# Patient Record
Sex: MALE | Race: WHITE
[De-identification: names, ages, dates, MRNs, and addresses within clinical notes are randomized per-mention and may not be internally consistent; named-entity substitution may affect disease eponyms.]

---

## 2021-08-23 ENCOUNTER — HOSPITAL ENCOUNTER (OUTPATIENT)
Dept: HOSPITAL 47 - LABWHC1 | Age: 31
Discharge: HOME | End: 2021-08-23
Attending: INTERNAL MEDICINE
Payer: COMMERCIAL

## 2021-08-23 DIAGNOSIS — Z00.00: Primary | ICD-10-CM

## 2021-08-23 LAB
ALBUMIN SERPL-MCNC: 5.1 G/DL (ref 3.8–4.9)
ALBUMIN/GLOB SERPL: 2.32 G/DL (ref 1.6–3.17)
ALP SERPL-CCNC: 89 U/L (ref 41–126)
ALT SERPL-CCNC: 36 U/L (ref 10–49)
ANION GAP SERPL CALC-SCNC: 12.6 MMOL/L (ref 4–12)
AST SERPL-CCNC: 20 U/L (ref 14–35)
BASOPHILS # BLD AUTO: 0.02 X 10*3/UL (ref 0–0.1)
BASOPHILS NFR BLD AUTO: 0.2 %
BUN SERPL-SCNC: 15 MG/DL (ref 9–27)
BUN/CREAT SERPL: 13.64 RATIO (ref 12–20)
CALCIUM SPEC-MCNC: 9.5 MG/DL (ref 8.7–10.3)
CHLORIDE SERPL-SCNC: 105 MMOL/L (ref 96–109)
CHOLEST SERPL-MCNC: 200 MG/DL (ref 0–200)
CO2 SERPL-SCNC: 24.4 MMOL/L (ref 21.6–31.8)
EOSINOPHIL # BLD AUTO: 0.15 X 10*3/UL (ref 0.04–0.35)
EOSINOPHIL NFR BLD AUTO: 1.9 %
ERYTHROCYTE [DISTWIDTH] IN BLOOD BY AUTOMATED COUNT: 6.16 X 10*6/UL (ref 4.4–5.6)
ERYTHROCYTE [DISTWIDTH] IN BLOOD: 15 % (ref 11.5–14.5)
GLOBULIN SER CALC-MCNC: 2.2 G/DL (ref 1.6–3.3)
GLUCOSE SERPL-MCNC: 97 MG/DL (ref 70–110)
HBA1C MFR BLD: 5.6 % (ref 4–6)
HCT VFR BLD AUTO: 51.6 % (ref 39.6–50)
HDLC SERPL-MCNC: 58 MG/DL (ref 40–60)
HGB BLD-MCNC: 15.6 G/DL (ref 13–17)
LDLC SERPL CALC-MCNC: 127.2 MG/DL (ref 0–131)
LYMPHOCYTES # SPEC AUTO: 2.03 X 10*3/UL (ref 0.9–5)
LYMPHOCYTES NFR SPEC AUTO: 25.1 %
MCH RBC QN AUTO: 25.3 PG (ref 27–32)
MCHC RBC AUTO-ENTMCNC: 30.2 G/DL (ref 32–37)
MCV RBC AUTO: 83.8 FL (ref 80–97)
MONOCYTES # BLD AUTO: 0.58 X 10*3/UL (ref 0.2–1)
MONOCYTES NFR BLD AUTO: 7.2 %
NEUTROPHILS # BLD AUTO: 5.29 X 10*3/UL (ref 1.8–7.7)
NEUTROPHILS NFR BLD AUTO: 65.4 %
PLATELET # BLD AUTO: 234 X 10*3/UL (ref 140–440)
POTASSIUM SERPL-SCNC: 4.7 MMOL/L (ref 3.5–5.5)
PROT SERPL-MCNC: 7.3 G/DL (ref 6.2–8.2)
PSA SERPL-MCNC: 0.4 NG/ML (ref 0–4)
SODIUM SERPL-SCNC: 142 MMOL/L (ref 135–145)
TRIGL SERPL-MCNC: 74 MG/DL (ref 0–149)
VIT B12 SERPL-MCNC: 1204 PG/ML (ref 200–944)
VLDLC SERPL CALC-MCNC: 14.8 MG/DL (ref 5–40)
WBC # BLD AUTO: 8.09 X 10*3/UL (ref 4.5–10)

## 2021-08-23 PROCEDURE — 82746 ASSAY OF FOLIC ACID SERUM: CPT

## 2021-08-23 PROCEDURE — 82306 VITAMIN D 25 HYDROXY: CPT

## 2021-08-23 PROCEDURE — 36415 COLL VENOUS BLD VENIPUNCTURE: CPT

## 2021-08-23 PROCEDURE — 80061 LIPID PANEL: CPT

## 2021-08-23 PROCEDURE — 82607 VITAMIN B-12: CPT

## 2021-08-23 PROCEDURE — 80053 COMPREHEN METABOLIC PANEL: CPT

## 2021-08-23 PROCEDURE — 83036 HEMOGLOBIN GLYCOSYLATED A1C: CPT

## 2021-08-23 PROCEDURE — 84443 ASSAY THYROID STIM HORMONE: CPT

## 2021-08-23 PROCEDURE — 85025 COMPLETE CBC W/AUTO DIFF WBC: CPT

## 2021-12-08 ENCOUNTER — HOSPITAL ENCOUNTER (INPATIENT)
Dept: HOSPITAL 47 - EC | Age: 31
LOS: 5 days | Discharge: HOME | DRG: 177 | End: 2021-12-13
Attending: INTERNAL MEDICINE | Admitting: INTERNAL MEDICINE
Payer: COMMERCIAL

## 2021-12-08 DIAGNOSIS — J12.82: ICD-10-CM

## 2021-12-08 DIAGNOSIS — R16.0: ICD-10-CM

## 2021-12-08 DIAGNOSIS — R74.02: ICD-10-CM

## 2021-12-08 DIAGNOSIS — J96.01: ICD-10-CM

## 2021-12-08 DIAGNOSIS — R79.89: ICD-10-CM

## 2021-12-08 DIAGNOSIS — R53.81: ICD-10-CM

## 2021-12-08 DIAGNOSIS — F98.8: ICD-10-CM

## 2021-12-08 DIAGNOSIS — Z79.82: ICD-10-CM

## 2021-12-08 DIAGNOSIS — U07.1: Primary | ICD-10-CM

## 2021-12-08 DIAGNOSIS — Z79.01: ICD-10-CM

## 2021-12-08 DIAGNOSIS — R79.82: ICD-10-CM

## 2021-12-08 DIAGNOSIS — E66.9: ICD-10-CM

## 2021-12-08 DIAGNOSIS — R74.8: ICD-10-CM

## 2021-12-08 DIAGNOSIS — R74.01: ICD-10-CM

## 2021-12-08 DIAGNOSIS — K80.20: ICD-10-CM

## 2021-12-08 DIAGNOSIS — I10: ICD-10-CM

## 2021-12-08 LAB
ALBUMIN SERPL-MCNC: 4 G/DL (ref 3.5–5)
ALP SERPL-CCNC: 92 U/L (ref 38–126)
ALT SERPL-CCNC: 68 U/L (ref 4–49)
ANION GAP SERPL CALC-SCNC: 13 MMOL/L
APTT BLD: 24.3 SEC (ref 22–30)
AST SERPL-CCNC: 54 U/L (ref 17–59)
BASOPHILS # BLD AUTO: 0 K/UL (ref 0–0.2)
BASOPHILS NFR BLD AUTO: 0 %
BUN SERPL-SCNC: 20 MG/DL (ref 9–20)
CALCIUM SPEC-MCNC: 8.6 MG/DL (ref 8.4–10.2)
CHLORIDE SERPL-SCNC: 105 MMOL/L (ref 98–107)
CO2 SERPL-SCNC: 23 MMOL/L (ref 22–30)
EOSINOPHIL # BLD AUTO: 0 K/UL (ref 0–0.7)
EOSINOPHIL NFR BLD AUTO: 0 %
ERYTHROCYTE [DISTWIDTH] IN BLOOD BY AUTOMATED COUNT: 6.31 M/UL (ref 4.3–5.9)
ERYTHROCYTE [DISTWIDTH] IN BLOOD: 13.7 % (ref 11.5–15.5)
FERRITIN SERPL-MCNC: 848 NG/ML (ref 22–322)
GLUCOSE SERPL-MCNC: 97 MG/DL (ref 74–99)
HCT VFR BLD AUTO: 52.2 % (ref 39–53)
HGB BLD-MCNC: 16.9 GM/DL (ref 13–17.5)
INR PPP: 1.3 (ref ?–1.2)
LDH SPEC-CCNC: 941 U/L (ref 313–618)
LYMPHOCYTES # SPEC AUTO: 0.8 K/UL (ref 1–4.8)
LYMPHOCYTES NFR SPEC AUTO: 12 %
MAGNESIUM SPEC-SCNC: 1.8 MG/DL (ref 1.6–2.3)
MCH RBC QN AUTO: 26.7 PG (ref 25–35)
MCHC RBC AUTO-ENTMCNC: 32.3 G/DL (ref 31–37)
MCV RBC AUTO: 82.6 FL (ref 80–100)
MONOCYTES # BLD AUTO: 0.5 K/UL (ref 0–1)
MONOCYTES NFR BLD AUTO: 7 %
NEUTROPHILS # BLD AUTO: 5.4 K/UL (ref 1.3–7.7)
NEUTROPHILS NFR BLD AUTO: 79 %
PLATELET # BLD AUTO: 160 K/UL (ref 150–450)
POTASSIUM SERPL-SCNC: 4.8 MMOL/L (ref 3.5–5.1)
PROT SERPL-MCNC: 7 G/DL (ref 6.3–8.2)
PT BLD: 13 SEC (ref 9–12)
SODIUM SERPL-SCNC: 141 MMOL/L (ref 137–145)
WBC # BLD AUTO: 6.8 K/UL (ref 3.8–10.6)

## 2021-12-08 PROCEDURE — 86140 C-REACTIVE PROTEIN: CPT

## 2021-12-08 PROCEDURE — 87635 SARS-COV-2 COVID-19 AMP PRB: CPT

## 2021-12-08 PROCEDURE — XW033E5 INTRODUCTION OF REMDESIVIR ANTI-INFECTIVE INTO PERIPHERAL VEIN, PERCUTANEOUS APPROACH, NEW TECHNOLOGY GROUP 5: ICD-10-PCS

## 2021-12-08 PROCEDURE — 86707 HEPATITIS BE ANTIBODY: CPT

## 2021-12-08 PROCEDURE — 86738 MYCOPLASMA ANTIBODY: CPT

## 2021-12-08 PROCEDURE — 99285 EMERGENCY DEPT VISIT HI MDM: CPT

## 2021-12-08 PROCEDURE — 86803 HEPATITIS C AB TEST: CPT

## 2021-12-08 PROCEDURE — 93970 EXTREMITY STUDY: CPT

## 2021-12-08 PROCEDURE — 85025 COMPLETE CBC W/AUTO DIFF WBC: CPT

## 2021-12-08 PROCEDURE — 71046 X-RAY EXAM CHEST 2 VIEWS: CPT

## 2021-12-08 PROCEDURE — 93005 ELECTROCARDIOGRAM TRACING: CPT

## 2021-12-08 PROCEDURE — 81001 URINALYSIS AUTO W/SCOPE: CPT

## 2021-12-08 PROCEDURE — 85730 THROMBOPLASTIN TIME PARTIAL: CPT

## 2021-12-08 PROCEDURE — 87350 HEPATITIS BE AG IA: CPT

## 2021-12-08 PROCEDURE — 83735 ASSAY OF MAGNESIUM: CPT

## 2021-12-08 PROCEDURE — 87502 INFLUENZA DNA AMP PROBE: CPT

## 2021-12-08 PROCEDURE — 83615 LACTATE (LD) (LDH) ENZYME: CPT

## 2021-12-08 PROCEDURE — 83605 ASSAY OF LACTIC ACID: CPT

## 2021-12-08 PROCEDURE — 86708 HEPATITIS A ANTIBODY: CPT

## 2021-12-08 PROCEDURE — 82728 ASSAY OF FERRITIN: CPT

## 2021-12-08 PROCEDURE — 86709 HEPATITIS A IGM ANTIBODY: CPT

## 2021-12-08 PROCEDURE — 85027 COMPLETE CBC AUTOMATED: CPT

## 2021-12-08 PROCEDURE — 36415 COLL VENOUS BLD VENIPUNCTURE: CPT

## 2021-12-08 PROCEDURE — 85379 FIBRIN DEGRADATION QUANT: CPT

## 2021-12-08 PROCEDURE — 80053 COMPREHEN METABOLIC PANEL: CPT

## 2021-12-08 PROCEDURE — 76705 ECHO EXAM OF ABDOMEN: CPT

## 2021-12-08 PROCEDURE — 87340 HEPATITIS B SURFACE AG IA: CPT

## 2021-12-08 PROCEDURE — 87040 BLOOD CULTURE FOR BACTERIA: CPT

## 2021-12-08 PROCEDURE — 86706 HEP B SURFACE ANTIBODY: CPT

## 2021-12-08 PROCEDURE — 71045 X-RAY EXAM CHEST 1 VIEW: CPT

## 2021-12-08 PROCEDURE — 85610 PROTHROMBIN TIME: CPT

## 2021-12-08 PROCEDURE — 84145 PROCALCITONIN (PCT): CPT

## 2021-12-08 PROCEDURE — 86704 HEP B CORE ANTIBODY TOTAL: CPT

## 2021-12-08 PROCEDURE — 96374 THER/PROPH/DIAG INJ IV PUSH: CPT

## 2021-12-08 RX ADMIN — CEFAZOLIN SCH: 330 INJECTION, POWDER, FOR SOLUTION INTRAMUSCULAR; INTRAVENOUS at 22:34

## 2021-12-08 RX ADMIN — CEFAZOLIN SCH MLS/HR: 330 INJECTION, POWDER, FOR SOLUTION INTRAMUSCULAR; INTRAVENOUS at 18:45

## 2021-12-08 RX ADMIN — CEFAZOLIN SCH MLS/HR: 330 INJECTION, POWDER, FOR SOLUTION INTRAMUSCULAR; INTRAVENOUS at 22:08

## 2021-12-08 NOTE — XR
EXAMINATION TYPE: XR chest 2V

 

DATE OF EXAM: 12/8/2021

 

COMPARISON: NONE

 

HISTORY: Suspected Covid 19 pneumonia, cough and chest congestion, Covid positive

 

TECHNIQUE:  Frontal and lateral views of the chest are obtained.

 

FINDINGS:  Lung volumes are low and the patient is rotated. Patchy increased attenuation is present i
n the bilateral lungs. No evident pneumothorax or pleural effusion. Cardiac mediastinal silhouette wi
thin normal limits accounting for technique.

 

IMPRESSION:  Expiratory rotated exam. Correlate for pneumonia. Follow-up as indicated.

## 2021-12-08 NOTE — ED
SOB HPI





- General


Chief Complaint: Shortness of Breath


Stated Complaint: Covid+/AYESHA


Time Seen by Provider: 12/08/21 09:22


Source: patient, RN notes reviewed


Mode of arrival: wheelchair


Limitations: no limitations





- History of Present Illness


Initial Comments: 





31-year-old male that presents to the emergency department complaining of 

increased shortness of breath especially on exertion.  He notes he tested 

positive for Covid 3 days ago at [facility].  Patient notes that 

he was sent home with medications but is not getting any better.  Patient didn't

appear to be mildly distressed and short of breath while sitting up in bed 

during exam and interview.  He denied any chest pain headache nausea vomiting 

diarrhea constipation fatigue chills.





- Related Data


                                    Allergies











Allergy/AdvReac Type Severity Reaction Status Date / Time


 


No Known Allergies Allergy   Verified 12/08/21 11:28














Review of Systems


ROS Statement: 


Those systems with pertinent positive or pertinent negative responses have been 

documented in the HPI.





ROS Other: All systems not noted in ROS Statement are negative.





Past Medical History


Past Medical History: No Reported History


History of Any Multi-Drug Resistant Organisms: None Reported


Past Surgical History: No Surgical Hx Reported


Past Psychological History: No Psychological Hx Reported


Smoking Status: Never smoker


Past Alcohol Use History: Rare


Past Drug Use History: None Reported





General Exam


Limitations: no limitations


General appearance: alert, in no apparent distress, obese


Head exam: Present: atraumatic, normocephalic, normal inspection


Eye exam: Present: normal appearance, PERRL, EOMI.  Absent: scleral icterus, 

conjunctival injection, periorbital swelling


ENT exam: Present: normal exam


Neck exam: Present: normal inspection.  Absent: tenderness, meningismus, 

lymphadenopathy


Respiratory exam: Present: decreased breath sounds (Bilaterally lower lobes).  

Absent: respiratory distress, wheezes, rales, rhonchi, stridor


Cardiovascular Exam: Present: regular rate, normal rhythm, normal heart sounds. 

Absent: systolic murmur, diastolic murmur, rubs, gallop, clicks


GI/Abdominal exam: Present: soft, normal bowel sounds.  Absent: distended, 

tenderness, guarding, rebound, rigid


Extremities exam: Present: normal inspection, full ROM, normal capillary refill.

 Absent: tenderness, pedal edema, joint swelling, calf tenderness


Neurological exam: Present: alert, oriented X3


Psychiatric exam: Present: normal affect, normal mood


Skin exam: Present: warm, dry, intact, normal color.  Absent: rash





Course





                                   Vital Signs











  12/08/21 12/08/21 12/08/21





  09:15 09:58 10:22


 


Temperature 100.6 F H 101.6 F H 


 


Pulse Rate 104 H  


 


Respiratory 18  





Rate   


 


Blood Pressure 130/84  


 


O2 Sat by Pulse 94 L  91 L





Oximetry   














  12/08/21 12/08/21





  11:05 11:12


 


Temperature  101.2 F H


 


Pulse Rate  105 H


 


Respiratory  18





Rate  


 


Blood Pressure  124/86


 


O2 Sat by Pulse 87 L 91 L





Oximetry  














Medical Decision Making





- Medical Decision Making





31-year-old male Covid-positive increased shortness of breath.


Labs, chest x-ray, 600 mg of Motrin, 650 mg of Tylenol, 10 mg of Decadron 

ordered.


Labs consistent with Covid with elevated LDH and C-reactive protein.


Chest x-ray shows bilateral infiltrates consistent with pneumonia.


Patient oxygen saturation on room air is 90-91% while sitting in bed.  Patient 

was walked in from sitting to standing patient oxygen dropped to 86-87%.


2 L oxygen was started.


Case discussed with Dr. Juárez, patient will be admitted.


Dr. Dennis was consulted and will accept the admit





- Lab Data


Result diagrams: 


                                 12/08/21 09:57





                                 12/08/21 09:57





                                   Lab Results











  12/08/21 12/08/21 12/08/21 Range/Units





  09:57 09:57 09:57 


 


WBC  6.8    (3.8-10.6)  k/uL


 


RBC  6.31 H    (4.30-5.90)  m/uL


 


Hgb  16.9    (13.0-17.5)  gm/dL


 


Hct  52.2    (39.0-53.0)  %


 


MCV  82.6    (80.0-100.0)  fL


 


MCH  26.7    (25.0-35.0)  pg


 


MCHC  32.3    (31.0-37.0)  g/dL


 


RDW  13.7    (11.5-15.5)  %


 


Plt Count  160    (150-450)  k/uL


 


MPV  8.9    


 


Neutrophils %  79    %


 


Lymphocytes %  12    %


 


Monocytes %  7    %


 


Eosinophils %  0    %


 


Basophils %  0    %


 


Neutrophils #  5.4    (1.3-7.7)  k/uL


 


Lymphocytes #  0.8 L    (1.0-4.8)  k/uL


 


Monocytes #  0.5    (0-1.0)  k/uL


 


Eosinophils #  0.0    (0-0.7)  k/uL


 


Basophils #  0.0    (0-0.2)  k/uL


 


Sodium   141   (137-145)  mmol/L


 


Potassium   4.8   (3.5-5.1)  mmol/L


 


Chloride   105   ()  mmol/L


 


Carbon Dioxide   23   (22-30)  mmol/L


 


Anion Gap   13   mmol/L


 


BUN   20   (9-20)  mg/dL


 


Creatinine   1.24   (0.66-1.25)  mg/dL


 


Est GFR (CKD-EPI)AfAm   88   (>60 ml/min/1.73 sqM)  


 


Est GFR (CKD-EPI)NonAf   76   (>60 ml/min/1.73 sqM)  


 


Glucose   97   (74-99)  mg/dL


 


Plasma Lactic Acid Colin    1.1  (0.7-2.0)  mmol/L


 


Calcium   8.6   (8.4-10.2)  mg/dL


 


Magnesium   1.8   (1.6-2.3)  mg/dL


 


Total Bilirubin   0.6   (0.2-1.3)  mg/dL


 


AST   54   (17-59)  U/L


 


ALT   68 H   (4-49)  U/L


 


Alkaline Phosphatase   92   ()  U/L


 


Lactate Dehydrogenase   941 H   (313-618)  U/L


 


C-Reactive Protein   4.8 H   (<1.0)  mg/dL


 


Total Protein   7.0   (6.3-8.2)  g/dL


 


Albumin   4.0   (3.5-5.0)  g/dL














Disposition


Clinical Impression: 


 Pneumonia due to COVID-19 virus, Hypoxia, Fever





Disposition: ADMITTED AS IP TO THIS [facility]


Condition: Stable


Referrals: 


Cruz Liang MD [Primary Care Provider] - 1-2 days


Time of Disposition: 11:37

## 2021-12-08 NOTE — P.CNPUL
History of Present Illness


Consult date: 12/08/21


Requesting physician: Floyd Avina


Reason for consult: dyspnea, hypoxemia, abnormal CXR/CT


Chief complaint: Shortness of breath, cough, congestion


History of present illness: 





This is a very pleasant 31-year-old male patient with a history of obesity.  6 

days ago the patient went to put and his contact lens and felt that he had 

gotten something else 5.  A gun to [facility] for evaluation.  He

was found to have COVID-19 infection.  He was treated with azithromycin, Decad

winnie and an albuterol inhaler.  He presented here early there this morning with 

worsening shortness of breath cough and congestion.  Chest x-ray revealed 

evidence of low lung volumes.  Some patchy bilateral infiltrates consistent with

COVID-19 pneumonia.  White count 6.8.  Hemoglobin 16.9.  Lymphocytes 0.8.  

Sodium 141.  Potassium 4.8.  Creatinine 1.24.  AST 54.  ALT 68.  .  C-

reactive protein 4.8.  Corona virus by PCR positive.  He's been initiated on 

Lovenox and Decadron.  0.9 normal saline at 130 ML's per hour.  He is seen today

in consultation in the emergency department.  He is currently laying fairly 

comfortably on a stretcher.  He is quite weak and fatigued.  Short of breath wit

h minimal exertion.  Short of breath with conversation.  He is maintaining O2 

saturations in the low 90s on 2 L/m per nasal cannula, 87% saturation on room 

air.  He is febrile with a temperature of 101.6.  Blood pressure stable.  

Tachycardic.





Review of Systems





REVIEW OF SYSTEMS:


CONSTITUTIONAL: Generalized weakness, fatigue, fever.  Denies any recent 

significant weight loss or weight gain.


EYES: Denies change in vision.


EARS, NOSE, MOUTH, THROAT: Denies headaches, denies sore throat.


CARDIOVASCULAR: Denies chest pain, palpitations or syncopal episodes.


RESPIRATORY: Positive for shortness of breath, cough, congestion or hemoptysis.


GASTROINTESTINAL: Denies change in appetite, denies abdominal pain


GENITOURINARY: Denies hematuria, denies infections.


MUSKULOSKELETAL: Denies pain, denies swelling.


INTEGUMENTARY: Denies rash, denies eczema.


NEUROLOGICAL: Denies recent memory loss, no recent seizure activity. 


PSYCHIATRIC: Denies anxiety, denies depression.


HEMATOLOGIC/LYMPHATIC: Denies anemia, denies enlarged lymph nodes.








Past Medical History


Past Medical History: No Reported History


History of Any Multi-Drug Resistant Organisms: None Reported


Past Surgical History: No Surgical Hx Reported


Past Psychological History: No Psychological Hx Reported


Smoking Status: Never smoker


Past Alcohol Use History: Rare


Past Drug Use History: None Reported





Medications and Allergies


                                Home Medications











 Medication  Instructions  Recorded  Confirmed  Type


 


Albuterol Inhaler [Ventolin Hfa 1 - 2 puff INHALATION RT-Q4H PRN 12/08/21 12/08/21 History





Inhaler]    


 


Aspirin EC [Ecotrin] 325 mg PO DAILY 12/08/21 12/08/21 History


 


Azithromycin [Zithromax Z-pack (6 See Taper PO DAILY 12/08/21 12/08/21 History





tabs)]    


 


Butalbital/Acetaminophen 1 tab PO Q6H PRN 12/08/21 12/08/21 History





[Butalbital/Acetaminophen ]    


 


Dexamethasone [Decadron] 4 mg PO Q12H 12/08/21 12/08/21 History


 


Losartan Potassium 100 mg PO DAILY 12/08/21 12/08/21 History


 


Phentermine HCl [Adipex-P] 37.5 mg PO DAILY 12/08/21 12/08/21 History








                                    Allergies











Allergy/AdvReac Type Severity Reaction Status Date / Time


 


No Known Allergies Allergy   Verified 12/08/21 11:28














Physical Exam


Vitals: 


                                   Vital Signs











  Temp Pulse Resp BP Pulse Ox


 


 12/08/21 11:12  101.2 F H  105 H  18  124/86  91 L


 


 12/08/21 11:05      87 L


 


 12/08/21 10:22      91 L


 


 12/08/21 09:58  101.6 F H    


 


 12/08/21 09:15  100.6 F H  104 H  18  130/84  94 L








                                Intake and Output











 12/07/21 12/08/21 12/08/21





 22:59 06:59 14:59


 


Other:   


 


  Weight   122.47 kg














GENERAL EXAM: Alert, pleasant 31-year-old, on 2 L/m per nasal cannula, afebrile,

appears ill. 


HEAD: Normocephalic.


EYES: Normal reaction of pupils, equal size.


NOSE: Clear with pink turbinates.


THROAT: No erythema or exudates.


NECK: No masses, no JVD.


CHEST: No chest wall deformity.


LUNGS: Equal air entry with crackles in bilateral bases


VS: S1 and S2 normal with no audible murmur, regular rhythm.


ABDOMEN: No hepatosplenomegaly, normal bowel sounds, no guarding or rigidity.


SPINE: No scoliosis or deformity


SKIN: No rashes


CENTRAL NERVOUS SYSTEM: No focal deficits, tone is normal in all 4 extremities.


EXTREMITIES: There is no peripheral edema.  No clubbing, no cyanosis.  

Peripheral pulses are intact.





Results





- Laboratory Findings


CBC and BMP: 


                                 12/08/21 09:57





                                 12/08/21 09:57


Abnormal lab findings: 


                                  Abnormal Labs











  12/08/21 12/08/21 12/08/21





  09:57 09:57 11:37


 


RBC  6.31 H  


 


Lymphocytes #  0.8 L  


 


ALT   68 H 


 


Lactate Dehydrogenase   941 H 


 


C-Reactive Protein   4.8 H 


 


Coronavirus (PCR)    Detected A














- Diagnostic Findings


Chest x-ray: image reviewed





Assessment and Plan


Assessment: 





1 Acute hypoxic respiratory failure secondary to COVID-19 pneumonia, currently 

maintaining O2 saturations in the low 90s on 2 L/m per nasal cannula. Not 

vaccinated. Symptoms started 6 days ago.  We will initiate Remdesivir.  





2 Elevated inflammatory markers secondary to above 





3 Obesity BMI of 37.7 kg/m





Plan:





The patient was seen and evaluated


Chest x-ray and labs reviewed


Initiate Lovenox, Decadron, vitamin supplements


Initiate Remdesivir


Titrate the FiO2 as tolerated


We will continue to follow and make further recommendations based on his 

clinical status





I, the cosigning physician, performed a history & physical examination of the 

patient. Lungs sounds crackles in the bilateral bases.  Maintaining good O2 

saturations in the 90s on 2 L/m per nasal cannula.  I discussed the assessment a

nd plan of care with my nurse practitioner, Malu Cristobal. I attest to the above 

consultation as dictated by her.


Time with Patient: Greater than 30

## 2021-12-08 NOTE — P.HPIM
History of Present Illness


Chief Complaint: Fever and shortness of breath





This is a very pleasant 30-year-old male with history of hypertension who came 

to emergency department for abolition of shortness of breath and fevers.  5 days

prior to this admission, about this last Friday, patient started developing URI-

like symptoms with sore throat and runny nose loss of sense of smell and taste 

and started having fevers.  He went to the urgent care where he was diagnosed 

with COVID-19.  he was given albuterol when necessary, Zithromax and dexame

thasone and sent home.  He is respiratory symptoms continued to worsen in or the

last couple days he started having lots of dry cough shortness of breath with 

minimal activity feeling of the chest congestion generalized weakness malaise.  

He continued to run fevers above 101.  He eventually came to MRSA department.  

Chest x-ray showed bilateral pneumonia.  Patient was hypoxic with oxygen in the 

range of 87% on room air.  He was placed on nasal cannula even admitted to the 

hospital.  Otherwise he's feeling very tired and weak.  He denies any purulence 

in his sputum.  He denies any chest pain leg swelling diarrhea or abdominal 

pain.  His appetite remains poor.  CBC showed a normal white blood cell count.  

Biochemistry with normal renal function and lactulose.  His CRP is 4.8.  Rest of

the labs currently pending.


Patient states that he has history of hypertension for which he was prescribed 

losartan however he does not take it.  He is vaccinated.  He also has attention 

deficit disorder for which she is taking phentermine.  He denies any history of 

asthma, smoking or any frequent respiratory infections or pneumonias.





Review of Systems


All systems: negative





Past Medical History


Past Medical History: No Reported History


History of Any Multi-Drug Resistant Organisms: None Reported


Past Surgical History: No Surgical Hx Reported


Past Psychological History: No Psychological Hx Reported


Smoking Status: Never smoker


Past Alcohol Use History: Rare


Past Drug Use History: None Reported





Medications and Allergies


                                Home Medications











 Medication  Instructions  Recorded  Confirmed  Type


 


Albuterol Inhaler [Ventolin Hfa 1 - 2 puff INHALATION RT-Q4H PRN 12/08/21 12/08/21 History





Inhaler]    


 


Aspirin EC [Ecotrin] 325 mg PO DAILY 12/08/21 12/08/21 History


 


Azithromycin [Zithromax Z-pack (6 See Taper PO DAILY 12/08/21 12/08/21 History





tabs)]    


 


Butalbital/Acetaminophen 1 tab PO Q6H PRN 12/08/21 12/08/21 History





[Butalbital/Acetaminophen ]    


 


Dexamethasone [Decadron] 4 mg PO Q12H 12/08/21 12/08/21 History


 


Losartan Potassium 100 mg PO DAILY 12/08/21 12/08/21 History


 


Phentermine HCl [Adipex-P] 37.5 mg PO DAILY 12/08/21 12/08/21 History








                                    Allergies











Allergy/AdvReac Type Severity Reaction Status Date / Time


 


No Known Allergies Allergy   Verified 12/08/21 11:28














Physical Exam


Vitals: 


                                   Vital Signs











  Temp Pulse Resp BP Pulse Ox


 


 12/08/21 11:12  101.2 F H  105 H  18  124/86  91 L


 


 12/08/21 11:05      87 L


 


 12/08/21 10:22      91 L


 


 12/08/21 09:58  101.6 F H    


 


 12/08/21 09:15  100.6 F H  104 H  18  130/84  94 L








                                Intake and Output











 12/07/21 12/08/21 12/08/21





 22:59 06:59 14:59


 


Other:   


 


  Weight   122.47 kg














Patient is awake alert oriented 3 no any distress.  Appears somewhat tired


Head and neck: Anicteric sclera or finger mucosa is moist without lesions no 

neck masses no JVD no facial asymmetry


Lungs nonlabored breathing, on nasal cannula, normal breath sounds, bibasilar 

fine crackles, no rhonchi or wheezing


Cardiovascular: Regular rhythm and rate S1-S2 no murmurs rubs or gallops


Abdomen is soft nontender nondistended no organomegaly no flank tenderness bowel

sounds are present


Extremities: No peripheral edema warm well perfused no cough tenderness no cyan

osis


Muscular skeletal: No joint swelling or deformities


Skin: No rashes


Neurological: No motor deficits no asterixis no chronic nerve deficits


Psychiatric: No depression or anxiety





Results


CBC & Chem 7: 


                                 12/08/21 09:57





                                 12/08/21 09:57


Labs: 


                  Abnormal Lab Results - Last 24 Hours (Table)











  12/08/21 12/08/21 12/08/21 Range/Units





  09:57 09:57 11:37 


 


RBC  6.31 H    (4.30-5.90)  m/uL


 


Lymphocytes #  0.8 L    (1.0-4.8)  k/uL


 


ALT   68 H   (4-49)  U/L


 


Lactate Dehydrogenase   941 H   (313-618)  U/L


 


C-Reactive Protein   4.8 H   (<1.0)  mg/dL


 


Coronavirus (PCR)    Detected A  (Not Detectd)  














Assessment and Plan


Plan: 





#COVID-19 pneumonitis





Onset of symptoms: 5 days prior to admission


Date of diagnosis: 12/03/21


Unvaccinated





Chest x-ray: Bilateral pneumonia





CRP: 4.8


D-dimer: Pro calcitonin pending





Current oxygen requirements: 2 L nasal cannula








Patient will be admitted to inpatient


Pulmonary consultation


Start dexamethasone, bronchodilators, pulmonary toilet, self pronating, Tylenol 

when necessary fever


Evaluate for Remdesevir


If d-dimer comes elevated further imaging may be needed








#Hypertension


Currently blood pressure stable


Patient is not adherent to his medical therapy


Continue to monitor his blood pressures





DVT prophylaxis: Lovenox subcu


Encourage out of bed


We will needs more than 2 minutes hospital stay


full code


Home medications reviewed

## 2021-12-09 LAB
ALBUMIN SERPL-MCNC: 3.8 G/DL (ref 3.8–4.9)
ALBUMIN/GLOB SERPL: 1.55 G/DL (ref 1.6–3.17)
ALP SERPL-CCNC: 103 U/L (ref 41–126)
ALT SERPL-CCNC: 85 U/L (ref 10–49)
ANION GAP SERPL CALC-SCNC: 15.2 MMOL/L (ref 10–18)
AST SERPL-CCNC: 53 U/L (ref 14–35)
BUN SERPL-SCNC: 22.1 MG/DL (ref 9–27)
BUN/CREAT SERPL: 20.65 RATIO (ref 12–20)
CALCIUM SPEC-MCNC: 8.5 MG/DL (ref 8.7–10.3)
CHLORIDE SERPL-SCNC: 107 MMOL/L (ref 96–109)
CO2 SERPL-SCNC: 21.3 MMOL/L (ref 20–27.5)
GLOBULIN SER CALC-MCNC: 2.5 G/DL (ref 1.6–3.3)
GLUCOSE SERPL-MCNC: 87 MG/DL (ref 70–110)
LDH SPEC-CCNC: 357 U/L (ref 120–246)
POTASSIUM SERPL-SCNC: 4.9 MMOL/L (ref 3.5–5.5)
PROT SERPL-MCNC: 6.3 G/DL (ref 6.2–8.2)
SODIUM SERPL-SCNC: 143 MMOL/L (ref 135–145)

## 2021-12-09 RX ADMIN — OXYCODONE HYDROCHLORIDE AND ACETAMINOPHEN SCH MG: 500 TABLET ORAL at 20:09

## 2021-12-09 RX ADMIN — ENOXAPARIN SODIUM SCH MG: 40 INJECTION SUBCUTANEOUS at 08:49

## 2021-12-09 RX ADMIN — CEFAZOLIN SCH MLS/HR: 330 INJECTION, POWDER, FOR SOLUTION INTRAMUSCULAR; INTRAVENOUS at 18:56

## 2021-12-09 RX ADMIN — REMDESIVIR SCH MLS/HR: 100 INJECTION, POWDER, LYOPHILIZED, FOR SOLUTION INTRAVENOUS at 16:28

## 2021-12-09 RX ADMIN — ACETAMINOPHEN PRN MG: 325 TABLET, FILM COATED ORAL at 08:49

## 2021-12-09 RX ADMIN — ACETAMINOPHEN PRN MG: 325 TABLET, FILM COATED ORAL at 14:15

## 2021-12-09 RX ADMIN — CEFAZOLIN SCH MLS/HR: 330 INJECTION, POWDER, FOR SOLUTION INTRAMUSCULAR; INTRAVENOUS at 14:15

## 2021-12-09 RX ADMIN — ACETAMINOPHEN PRN MG: 325 TABLET, FILM COATED ORAL at 20:10

## 2021-12-09 RX ADMIN — ACETAMINOPHEN PRN MG: 325 TABLET, FILM COATED ORAL at 02:50

## 2021-12-09 NOTE — P.PN
Subjective


Principal diagnosis: 





COVID-19 pneumonitis








31-year-old male history of hypertension, admitted with COVID-19 bilateral 

pneumonia and hypoxia





Patient continues to feel weak with malaise and shortness of breath.  Remains 

febrile.





Objective





- Vital Signs


Vital signs: 


                                   Vital Signs











Temp  99.9 F H  12/09/21 08:54


 


Pulse  90   12/09/21 08:54


 


Resp  20   12/09/21 08:54


 


BP  136/87   12/09/21 08:54


 


Pulse Ox  93 L  12/09/21 08:54








                                 Intake & Output











 12/08/21 12/09/21 12/09/21





 18:59 06:59 18:59


 


Intake Total  1480 


 


Balance  1480 


 


Weight 122.47 kg  


 


Intake:   


 


  Intake, IV Titration  1000 





  Amount   


 


    Sodium Chloride 0.9% 1,  1000 





    000 ml @ 130 mls/hr IV .   





    Q7H42M [facility] Rx#:[id number]   


 


  Oral  480 














- Exam








Patient is awake alert oriented 3 no any distress.  Appears somewhat tired


Head and neck: Anicteric sclera or finger mucosa is moist without lesions no 

neck masses no JVD no facial asymmetry


Lungs nonlabored breathing, on nasal cannula, normal breath sounds, bibasilar 

fine crackles, no rhonchi or wheezing


Cardiovascular: Regular rhythm and rate S1-S2 no murmurs rubs or gallops


Abdomen is soft nontender nondistended no organomegaly no flank tenderness bowel

sounds are present


Extremities: No peripheral edema warm well perfused no cough tenderness no 

cyanosis


Muscular skeletal: No joint swelling or deformities


Skin: No rashes


Neurological: No motor deficits no asterixis no chronic nerve deficits


Psychiatric: No depression or anxiety





- Labs


CBC & Chem 7: 


                                 12/08/21 09:57





                                 12/08/21 09:57


Labs: 


                  Abnormal Lab Results - Last 24 Hours (Table)











  12/08/21 12/08/21 12/08/21 Range/Units





  09:57 09:57 09:57 


 


RBC  6.31 H    (4.30-5.90)  m/uL


 


Lymphocytes #  0.8 L    (1.0-4.8)  k/uL


 


PT     (9.0-12.0)  sec


 


INR     (<1.2)  


 


D-Dimer     (<0.60)  mg/L FEU


 


Ferritin   848.0 H   (22.0-322.0)  ng/mL


 


ALT   68 H   (4-49)  U/L


 


Lactate Dehydrogenase   941 H   (313-618)  U/L


 


C-Reactive Protein   4.8 H   (<1.0)  mg/dL


 


Procalcitonin    0.15 H  (0.02-0.09)  ng/mL


 


Coronavirus (PCR)     (Not Detectd)  














  12/08/21 12/08/21 12/09/21 Range/Units





  11:37 14:42 06:11 


 


RBC     (4.30-5.90)  m/uL


 


Lymphocytes #     (1.0-4.8)  k/uL


 


PT   13.0 H   (9.0-12.0)  sec


 


INR   1.3 H   (<1.2)  


 


D-Dimer    0.98 H  (<0.60)  mg/L FEU


 


Ferritin     (22.0-322.0)  ng/mL


 


ALT     (4-49)  U/L


 


Lactate Dehydrogenase     (313-618)  U/L


 


C-Reactive Protein     (<1.0)  mg/dL


 


Procalcitonin     (0.02-0.09)  ng/mL


 


Coronavirus (PCR)  Detected A    (Not Detectd)  














Assessment and Plan


Plan: 





#COVID-19 pneumonitis





Onset of symptoms: 5 days prior to admission


Date of diagnosis: 12/03/21


Unvaccinated





Chest x-ray: Bilateral pneumonia





CRP: 4.8


D-dimer: Pro calcitonin pending





Current oxygen requirements: 2 L nasal cannula





Treatment: remdesevir, dexamethasone, bronchodilators, pulmonary toilet, self 

pronating, Tylenol when necessary fever


D-dimer and procalcitonin normal








#Hypertension


Currently blood pressure stable


Patient is not adherent to his medical therapy


Continue to monitor his blood pressures





DVT prophylaxis: Lovenox subcu


Encourage out of bed

## 2021-12-09 NOTE — US
EXAMINATION TYPE: US venous doppler duplex LE 

 

DATE OF EXAM: 12/9/2021 3:11 PM

 

COMPARISON: NONE

 

CLINICAL HISTORY: elevated d-dimer.

 

Exam done portable on covid

 

SIDE PERFORMED: Bilateral  

 

TECHNIQUE:  The lower extremity deep venous system is examined utilizing real time linear array sonog
carroll with graded compression, doppler sonography and color-flow sonography.

 

VESSELS IMAGED:

Common Femoral Vein

Deep Femoral Vein

Greater Saphenous Vein *

Femoral Vein

Popliteal Vein

Small Saphenous Vein *

Proximal Calf Veins

(* superficial vessels)

 

 

 

Right Leg:  Appears negative for DVT

 

Left Leg:  Appears negative for DVT

 

 

 

IMPRESSION:  Grayscale, color doppler, spectral doppler imaging performed of the deep veins of the lo
wer extremities.  There is normal flow, compressibility, vascular waveforms.

## 2021-12-09 NOTE — P.PN
Subjective


Progress Note Date: 12/09/21


Principal diagnosis: 


 Dyspnea, hypoxia





This is a very pleasant 31-year-old male patient with a history of obesity.  6 

days ago the patient went to put and his contact lens and felt that he had 

gotten something else 5.  A gun to [facility] for evaluation.  He

was found to have COVID-19 infection.  He was treated with azithromycin, 

Decadron and an albuterol inhaler.  He presented here early there this morning 

with worsening shortness of breath cough and congestion.  Chest x-ray revealed 

evidence of low lung volumes.  Some patchy bilateral infiltrates consistent with

COVID-19 pneumonia.  White count 6.8.  Hemoglobin 16.9.  Lymphocytes 0.8.  

Sodium 141.  Potassium 4.8.  Creatinine 1.24.  AST 54.  ALT 68.  .  C-

reactive protein 4.8.  Corona virus by PCR positive.  He's been initiated on 

Lovenox and Decadron.  0.9 normal saline at 130 ML's per hour.  He is seen today

in consultation in the emergency department.  He is currently laying fairly 

comfortably on a stretcher.  He is quite weak and fatigued.  Short of breath 

with minimal exertion.  Short of breath with conversation.  He is maintaining O2

saturations in the low 90s on 2 L/m per nasal cannula, 87% saturation on room 

air.  He is febrile with a temperature of 101.6.  Blood pressure stable.  

Tachycardic.





On 12/09/2021 patient seen in follow-up on medical surgical floor.  He is 

resting in bed, he looks flushed, looks very fatigued, tired, but no distress, 

he remains on 4 L of oxygen pulse ox 95%, he has been febrile, with a T-max of 

100.1F.  Blood pressure has been stable.  Patient remains on Decadron 6 mg 

daily, he was started on Remdesivir yesterday, today is his second treatment, he

is on prophylactic Lovenox, and COVID-19 vitamins, he remains on IV fluids with 

0.9 normal saline at a rate of 1:30 ML per hour.  Today's labs have been 

reviewed, his d-dimer has increased and is up to 0.98, electrolytes and renal 

profile are within normal limits.  His LDH is improved and is down to 357, CRP 

is 9.5.








Objective





- Vital Signs


Vital signs: 


                                   Vital Signs











Temp  100.1 F H  12/09/21 09:47


 


Pulse  85   12/09/21 09:47


 


Resp  18   12/09/21 09:47


 


BP  146/67   12/09/21 09:47


 


Pulse Ox  95   12/09/21 09:47








                                 Intake & Output











 12/08/21 12/09/21 12/09/21





 18:59 06:59 18:59


 


Intake Total  1480 


 


Balance  1480 


 


Weight 122.47 kg  


 


Intake:   


 


  Intake, IV Titration  1000 





  Amount   


 


    Sodium Chloride 0.9% 1,  1000 





    000 ml @ 130 mls/hr IV .   





    Q7H42M [facility] Rx#:[id number]   


 


  Oral  480 














- Exam


 GENERAL EXAM: Alert, very pleasant, 31-year-old white male, currently on 4 L 

oxygen with pulse ox of 95% comfortable in no apparent distress.  he appears to 

be very fatigued, tired, he looks flushed, but no apparent distress


HEAD: Normocephalic/atraumatic.


EYES: Normal reaction of pupils, equal size.  Conjunctiva pink, sclera white.


NOSE: Clear with pink turbinates.


THROAT: No erythema or exudates.


NECK: No masses, no JVD, no thyroid enlargement, no adenopathy.


CHEST: No chest wall deformity.  Symmetrical expansion. 


LUNGS: Equal air entry with bibasilar crackles


CVS: Regular rate and rhythm, normal S1 and S2, no gallops, no murmurs, no rubs


ABDOMEN: Soft, nontender.  No hepatosplenomegaly, normal bowel sounds, no 

guarding or rigidity.


EXTREMITIES: No clubbing, no edema, no cyanosis, 2+ pulses and upper and lower 

extremities.


MUSCULOSKELETAL: Muscle strength and tone normal.


SPINE: No scoliosis or deformity


SKIN: No rashes


CENTRAL NERVOUS SYSTEM: Lethargic, easily arousable











- Labs


CBC & Chem 7: 


                                 12/08/21 09:57





                                 12/09/21 06:11


Labs: 


                  Abnormal Lab Results - Last 24 Hours (Table)











  12/08/21 12/08/21 12/08/21 Range/Units





  09:57 09:57 14:42 


 


PT    13.0 H  (9.0-12.0)  sec


 


INR    1.3 H  (<1.2)  


 


D-Dimer     (<0.60)  mg/L FEU


 


BUN/Creatinine Ratio     (12.00-20.00)  Ratio


 


Calcium     (8.7-10.3)  mg/dL


 


Ferritin  848.0 H    (22.0-322.0)  ng/mL


 


AST     (14-35)  U/L


 


ALT     (10-49)  U/L


 


Lactate Dehydrogenase     (120-246)  U/L


 


C-Reactive Protein     (0.00-0.80)  mg/dL


 


Albumin/Globulin Ratio     (1.60-3.17)  g/dL


 


Procalcitonin   0.15 H   (0.02-0.09)  ng/mL














  12/09/21 12/09/21 Range/Units





  06:11 06:11 


 


PT    (9.0-12.0)  sec


 


INR    (<1.2)  


 


D-Dimer   0.98 H  (<0.60)  mg/L FEU


 


BUN/Creatinine Ratio  20.65 H   (12.00-20.00)  Ratio


 


Calcium  8.5 L   (8.7-10.3)  mg/dL


 


Ferritin    (22.0-322.0)  ng/mL


 


AST  53 H   (14-35)  U/L


 


ALT  85 H   (10-49)  U/L


 


Lactate Dehydrogenase  357 H   (120-246)  U/L


 


C-Reactive Protein  9.50 H   (0.00-0.80)  mg/dL


 


Albumin/Globulin Ratio  1.55 L   (1.60-3.17)  g/dL


 


Procalcitonin    (0.02-0.09)  ng/mL














Assessment and Plan


Plan: 


 Assessment:





#1.  Acute hypoxic respiratory failure secondary to COVID-19 pneumonia, patient 

is a non-vaccinated adult, came in with a 6 day history of symptoms, was started

on Remdesivir on 12/08/2021





#2.  Elevated inflammatory markers secondary to the above, improving





#3.  Obesity with BMI of 37.7 kg/m





#4.  Mildly elevated d-dimer, will obtain lower extremity Dopplers





Plan:





Continue Remdesivir, today is day 2 of treatment


Continue Decadron


Continue current dose Lovenox


We will obtain lower extremity Dopplers to rule out possibility of DVT


We'll continue to follow patient's clinical course





I performed a history & physical examination of the patient and discussed their 

management with my nurse practitioner, Jovita Chin.  I reviewed the nurse 

practitioner's note and agree with the documented findings and plan of care.  

Lung sounds are positive for dim breath sounds throughout the lung fields.  The 

findings and the impression was discussed with the patient.  I attest to the 

documentation by the nurse practitioner. 








Time with Patient: Less than 30

## 2021-12-10 LAB
ERYTHROCYTE [DISTWIDTH] IN BLOOD BY AUTOMATED COUNT: 6.04 X 10*6/UL (ref 4.4–5.6)
ERYTHROCYTE [DISTWIDTH] IN BLOOD: 15.3 % (ref 11.5–14.5)
HCT VFR BLD AUTO: 49.3 % (ref 39.6–50)
HGB BLD-MCNC: 15.5 G/DL (ref 13–17)
MCH RBC QN AUTO: 25.7 PG (ref 27–32)
MCHC RBC AUTO-ENTMCNC: 31.4 G/DL (ref 32–37)
MCV RBC AUTO: 81.6 FL (ref 80–97)
PH UR: 6 [PH] (ref 5–8)
PLATELET # BLD AUTO: 200 X 10*3/UL (ref 140–440)
PROT UR QL: (no result)
RBC UR QL: 1 /HPF (ref 0–5)
SP GR UR: 1.02 (ref 1–1.03)
UROBILINOGEN UR QL STRIP: <2 MG/DL (ref ?–2)
WBC # BLD AUTO: 7.54 X 10*3/UL (ref 4.5–10)
WBC # UR AUTO: 3 /HPF (ref 0–5)

## 2021-12-10 RX ADMIN — Medication SCH MCG: at 08:33

## 2021-12-10 RX ADMIN — CEFAZOLIN SCH MLS/HR: 330 INJECTION, POWDER, FOR SOLUTION INTRAMUSCULAR; INTRAVENOUS at 02:15

## 2021-12-10 RX ADMIN — ENOXAPARIN SODIUM SCH MG: 40 INJECTION SUBCUTANEOUS at 08:34

## 2021-12-10 RX ADMIN — IBUPROFEN PRN MG: 400 TABLET, FILM COATED ORAL at 21:47

## 2021-12-10 RX ADMIN — OXYCODONE HYDROCHLORIDE AND ACETAMINOPHEN SCH MG: 500 TABLET ORAL at 21:48

## 2021-12-10 RX ADMIN — OXYCODONE HYDROCHLORIDE AND ACETAMINOPHEN SCH MG: 500 TABLET ORAL at 08:33

## 2021-12-10 RX ADMIN — ACETAMINOPHEN PRN MG: 325 TABLET, FILM COATED ORAL at 06:01

## 2021-12-10 RX ADMIN — REMDESIVIR SCH MLS/HR: 100 INJECTION, POWDER, LYOPHILIZED, FOR SOLUTION INTRAVENOUS at 15:53

## 2021-12-10 RX ADMIN — IBUPROFEN PRN MG: 400 TABLET, FILM COATED ORAL at 10:18

## 2021-12-10 RX ADMIN — Medication SCH MG: at 08:33

## 2021-12-10 NOTE — XR
EXAMINATION TYPE: XR chest 1V portable

 

DATE OF EXAM: 12/10/2021

 

COMPARISON: Chest x-ray 12/8/2021

 

HISTORY: Covid pneumonia

 

TECHNIQUE: Single frontal view of the chest is obtained.

 

FINDINGS:  Patchy bilateral airspace disease shows a similar appearance. Lung volumes are low, there 
is no evident pneumothorax or pleural effusion. Cardiac mediastinal silhouette shows a similar appear
ance. There are overlying artifacts. Bones are stable.

 

IMPRESSION:  Correlate for pneumonia.

## 2021-12-10 NOTE — P.PN
Subjective


Principal diagnosis: 





COVID-19 pneumonitis








31-year-old male history of hypertension, admitted with COVID-19 bilateral 

pneumonia and hypoxia








Patient reports that he's been bringing up more yellowish phlegm.  Otherwise he 

feels that his breathing is a little better.  Still remains somewhat weak with 

malaise.  Appetite remains poor.  Denies any chest pain nausea vomiting or 

diarrhea.





Objective





- Vital Signs


Vital signs: 


                                   Vital Signs











Temp  100.9 F H  12/10/21 10:00


 


Pulse  89   12/10/21 10:00


 


Resp  22   12/10/21 10:00


 


BP  142/78   12/10/21 10:00


 


Pulse Ox  95   12/10/21 10:00








                                 Intake & Output











 12/09/21 12/10/21 12/10/21





 18:59 06:59 18:59


 


Intake Total 1450  


 


Balance 1450  


 


Intake:   


 


  Intake, IV Titration 1450  





  Amount   


 


    Remdesivir 100 mg In 250  





    Sodium Chloride 0.9% 250   





    ml @ 250 mls/hr IVPB   





    [facility]   





    [id number]   


 


    Sodium Chloride 0.9% 1, 1200  





    000 ml @ 130 mls/hr IV .   





    [facility][id number]   


 


Other:   


 


  Voiding Method  Toilet 





  Urinal 


 


  # Voids 2 3 














- Exam








Patient is awake alert oriented 3 no any distress.  Appears somewhat tired


Head and neck: Anicteric sclera or finger mucosa is moist without lesions no 

neck masses no JVD no facial asymmetry


Lungs nonlabored breathing, on nasal cannula, normal breath sounds, bibasilar 

fine crackles, no rhonchi or wheezing


Cardiovascular: Regular rhythm and rate S1-S2 no murmurs rubs or gallops


Abdomen is soft nontender nondistended no organomegaly no flank tenderness bowel

sounds are present


Extremities: No peripheral edema warm well perfused no cough tenderness no cya

nosis


Muscular skeletal: No joint swelling or deformities


Skin: No rashes


Neurological: No motor deficits no asterixis no chronic nerve deficits


Psychiatric: No depression or anxiety





- Labs


CBC & Chem 7: 


                                 12/10/21 06:24





                                 12/09/21 06:11


Labs: 


                  Abnormal Lab Results - Last 24 Hours (Table)











  12/10/21 Range/Units





  06:24 


 


RBC  6.04 H  (4.40-5.60)  X 10*6/uL


 


MCH  25.7 L  (27.0-32.0)  pg


 


MCHC  31.4 L  (32.0-37.0)  g/dL


 


RDW  15.3 H  (11.5-14.5)  %














Assessment and Plan


Plan: 





#COVID-19 pneumonitis





Onset of symptoms: 5 days prior to admission


Date of diagnosis: 12/03/21


Unvaccinated


Chest x-ray: Bilateral pneumonia





CRP: 4.8 on admission trended up to 9.5


D-dimer: Normal admission


Pro calcitonin 0.15, today's repeat pending





Current oxygen requirements: 45 L nasal cannula





Treatment: remdesevir, dexamethasone, bronchodilators, pulmonary toilet, self 

pronating, Tylenol when necessary fever





Follow-up chest x-ray, Pro calcitonin, CRP


Check influenza





#Elevated liver enzymes


No GI complaints


Alk phos and bilirubin normal


Could be related to effects of coronal virus


Continue to trend liver enzymes


Hepatitis panel ordered





#Hypertension


Currently blood pressure stable


Patient is not adherent to his medical therapy


Continue to monitor his blood pressures





DVT prophylaxis: Lovenox subcu


Encourage out of bed

## 2021-12-10 NOTE — P.PN
Subjective


Progress Note Date: 12/10/21


Principal diagnosis: 


 Dyspnea, hypoxia





This is a very pleasant 31-year-old male patient with a history of obesity.  6 

days ago the patient went to put and his contact lens and felt that he had 

gotten something else 5.  A gun to [facility] for evaluation.  He

was found to have COVID-19 infection.  He was treated with azithromycin, 

Decadron and an albuterol inhaler.  He presented here early there this morning 

with worsening shortness of breath cough and congestion.  Chest x-ray revealed 

evidence of low lung volumes.  Some patchy bilateral infiltrates consistent with

COVID-19 pneumonia.  White count 6.8.  Hemoglobin 16.9.  Lymphocytes 0.8.  

Sodium 141.  Potassium 4.8.  Creatinine 1.24.  AST 54.  ALT 68.  .  C-

reactive protein 4.8.  Corona virus by PCR positive.  He's been initiated on 

Lovenox and Decadron.  0.9 normal saline at 130 ML's per hour.  He is seen today

in consultation in the emergency department.  He is currently laying fairly 

comfortably on a stretcher.  He is quite weak and fatigued.  Short of breath 

with minimal exertion.  Short of breath with conversation.  He is maintaining O2

saturations in the low 90s on 2 L/m per nasal cannula, 87% saturation on room 

air.  He is febrile with a temperature of 101.6.  Blood pressure stable.  

Tachycardic.





On 12/09/2021 patient seen in follow-up on medical surgical floor.  He is 

resting in bed, he looks flushed, looks very fatigued, tired, but no distress, 

he remains on 4 L of oxygen pulse ox 95%, he has been febrile, with a T-max of 

100.1F.  Blood pressure has been stable.  Patient remains on Decadron 6 mg 

daily, he was started on Remdesivir yesterday, today is his second treatment, he

is on prophylactic Lovenox, and COVID-19 vitamins, he remains on IV fluids with 

0.9 normal saline at a rate of 1:30 ML per hour.  Today's labs have been 

reviewed, his d-dimer has increased and is up to 0.98, electrolytes and renal 

profile are within normal limits.  His LDH is improved and is down to 357, CRP 

is 9.5.





On 12/10/2021 patient is more awake, interactive, he is sitting up in bed, still

has a cough, he is bringing up yellow colored phlegm which will be sent for 

culture, he is still having intermittent fevers with a T-max of 101.4F this 

morning, he remains on 5 L of oxygen pulse ox is 94%, blood pressure stable, he 

is slightly tachycardic.  No pleurisy.  Lung sounds are diminished, with diffuse

crackles bilaterally.  Chest x-ray is pending, patient remains on Remdesivir, 

today is day 3 of treatment, he is on Decadron, and prophylactic Lovenox, lower 

externally Dopplers were negative for DVT.  Today's labs are still pending, 

yesterday's d-dimer was 0.98, elects lites and renal profile were unremarkable, 

his LDH had improved and was down to 357 on yesterday's labs, CRP has increased 

and is up to 9.5.  His pro calcitonin level on admission was 0.15.  Appetite is 

poor, however he denies any nausea vomiting or diarrhea.








Objective





- Vital Signs


Vital signs: 


                                   Vital Signs











Temp  101.4 F H  12/10/21 06:05


 


Pulse  93   12/10/21 06:05


 


Resp  18   12/10/21 06:05


 


BP  123/72   12/10/21 06:05


 


Pulse Ox  94 L  12/10/21 06:05








                                 Intake & Output











 12/09/21 12/10/21 12/10/21





 18:59 06:59 18:59


 


Intake Total 1450  


 


Balance 1450  


 


Intake:   


 


  Intake, IV Titration 1450  





  Amount   


 


    Remdesivir 100 mg In 250  





    Sodium Chloride 0.9% 250   





    ml @ 250 mls/hr IVPB   





    DAILY@1600 [facility] Rx#:   





    [id number]   


 


    Sodium Chloride 0.9% 1, 1200  





    000 ml @ 130 mls/hr IV .   





    Q7H42M [facility] Rx#:[id number]   


 


Other:   


 


  Voiding Method  Toilet 





  Urinal 


 


  # Voids 2 3 














- Exam


 GENERAL EXAM: Alert, very pleasant, 31-year-old white male, currently on 4 L 

oxygen with pulse ox of 95% comfortable in no apparent distress.  he appears to 

be very fatigued, tired, he looks flushed, but no apparent distress.  frequent 

cough, at times she is producing yellow colored phlegm


HEAD: Normocephalic/atraumatic.


EYES: Normal reaction of pupils, equal size.  Conjunctiva pink, sclera white.


NOSE: Clear with pink turbinates.


THROAT: No erythema or exudates.


NECK: No masses, no JVD, no thyroid enlargement, no adenopathy.


CHEST: No chest wall deformity.  Symmetrical expansion. 


LUNGS: Equal air entry with bibasilar crackles


CVS: Regular rate and rhythm, normal S1 and S2, no gallops, no murmurs, no rubs


ABDOMEN: Soft, nontender.  No hepatosplenomegaly, normal bowel sounds, no 

guarding or rigidity.


EXTREMITIES: No clubbing, no edema, no cyanosis, 2+ pulses and upper and lower 

extremities.


MUSCULOSKELETAL: Muscle strength and tone normal.


SPINE: No scoliosis or deformity


SKIN: No rashes


CENTRAL NERVOUS SYSTEM: Lethargic, easily arousable











- Labs


CBC & Chem 7: 


                                 12/08/21 09:57





                                 12/09/21 06:11





Assessment and Plan


Plan: 


 Assessment:





#1.  Acute hypoxic respiratory failure secondary to COVID-19 pneumonia, patient 

is a non-vaccinated adult, came in with a 6 day history of symptoms, was started

on Remdesivir on 12/08/2021





#2.  Elevated inflammatory markers secondary to the above, improving





#3.  Obesity with BMI of 37.7 kg/m





#4.  Mildly elevated d-dimer,  lower extremity Dopplers were negative for DVT





Plan:


A bit more awake, still coughing a lot, producing yellow colored phlegm


Still having febrile episodes, we'll obtain blood cultures, sputum and urin

alysis


We'll obtain follow-up chest x-ray today, follow-up pro calcitonin


Continue Remdesivir, today is day 3 of treatment


Continue Decadron


Continue current dose Lovenox


We'll continue following his d-dimer, inflammatory markers,


We'll continue to follow his clinical course and make further recommendations








I performed a history & physical examination of the patient and discussed their 

management with my nurse practitioner, Jovita Chin.  I reviewed the nurse 

practitioner's note and agree with the documented findings and plan of care.  

Lung sounds are positive for dim breath sounds throughout the lung fields.  The 

findings and the impression was discussed with the patient.  I attest to the 

documentation by the nurse practitioner. 








Time with Patient: Less than 30

## 2021-12-11 LAB
ALBUMIN SERPL-MCNC: 3.1 G/DL (ref 3.5–5)
ALBUMIN/GLOB SERPL: 1.1 {RATIO}
ALP SERPL-CCNC: 158 U/L (ref 38–126)
ALT SERPL-CCNC: 170 U/L (ref 4–49)
ANION GAP SERPL CALC-SCNC: 11 MMOL/L
AST SERPL-CCNC: 82 U/L (ref 17–59)
BUN SERPL-SCNC: 20 MG/DL (ref 9–20)
CALCIUM SPEC-MCNC: 8.6 MG/DL (ref 8.4–10.2)
CHLORIDE SERPL-SCNC: 110 MMOL/L (ref 98–107)
CO2 SERPL-SCNC: 22 MMOL/L (ref 22–30)
ERYTHROCYTE [DISTWIDTH] IN BLOOD BY AUTOMATED COUNT: 6.37 X 10*6/UL (ref 4.4–5.6)
ERYTHROCYTE [DISTWIDTH] IN BLOOD: 15.5 % (ref 11.5–14.5)
GLOBULIN SER CALC-MCNC: 2.8 G/DL
GLUCOSE SERPL-MCNC: 131 MG/DL (ref 74–99)
HBV SURFACE AB SERPL IA-ACNC: 1000 MIU/ML
HCT VFR BLD AUTO: 52.8 % (ref 39.6–50)
HGB BLD-MCNC: 16.2 G/DL (ref 13–17)
MAGNESIUM SPEC-SCNC: 2 MG/DL (ref 1.6–2.3)
MCH RBC QN AUTO: 25.4 PG (ref 27–32)
MCHC RBC AUTO-ENTMCNC: 30.7 G/DL (ref 32–37)
MCV RBC AUTO: 82.9 FL (ref 80–97)
PLATELET # BLD AUTO: 221 X 10*3/UL (ref 140–440)
POTASSIUM SERPL-SCNC: 4 MMOL/L (ref 3.5–5.1)
PROT SERPL-MCNC: 5.9 G/DL (ref 6.3–8.2)
SODIUM SERPL-SCNC: 143 MMOL/L (ref 137–145)
WBC # BLD AUTO: 6.4 X 10*3/UL (ref 4.5–10)

## 2021-12-11 RX ADMIN — Medication SCH MG: at 08:07

## 2021-12-11 RX ADMIN — IBUPROFEN PRN MG: 400 TABLET, FILM COATED ORAL at 22:29

## 2021-12-11 RX ADMIN — REMDESIVIR SCH MLS/HR: 100 INJECTION, POWDER, LYOPHILIZED, FOR SOLUTION INTRAVENOUS at 15:10

## 2021-12-11 RX ADMIN — ENOXAPARIN SODIUM SCH MG: 40 INJECTION SUBCUTANEOUS at 08:07

## 2021-12-11 RX ADMIN — OXYCODONE HYDROCHLORIDE AND ACETAMINOPHEN SCH MG: 500 TABLET ORAL at 08:07

## 2021-12-11 RX ADMIN — Medication SCH MCG: at 08:07

## 2021-12-11 RX ADMIN — CALCIUM CARBONATE (ANTACID) CHEW TAB 500 MG PRN MG: 500 CHEW TAB at 22:29

## 2021-12-11 RX ADMIN — OXYCODONE HYDROCHLORIDE AND ACETAMINOPHEN SCH MG: 500 TABLET ORAL at 22:29

## 2021-12-11 NOTE — P.PN
Subjective


Progress Note Date: 12/11/21


Principal diagnosis: 





Acute hypoxic respiratory failure secondary to COVID-19 pneumonia





This is a very pleasant 31-year-old male patient with a history of obesity.  6 

days ago the patient went to put and his contact lens and felt that he had 

gotten something else 5.  A gun to [facility] for evaluation.  He

was found to have COVID-19 infection.  He was treated with azithromycin, 

Decadron and an albuterol inhaler.  He presented here early there this morning 

with worsening shortness of breath cough and congestion.  Chest x-ray revealed 

evidence of low lung volumes.  Some patchy bilateral infiltrates consistent with

COVID-19 pneumonia.  White count 6.8.  Hemoglobin 16.9.  Lymphocytes 0.8.  

Sodium 141.  Potassium 4.8.  Creatinine 1.24.  AST 54.  ALT 68.  .  C-

reactive protein 4.8.  Corona virus by PCR positive.  He's been initiated on 

Lovenox and Decadron.  0.9 normal saline at 130 ML's per hour.  He is seen today

in consultation in the emergency department.  He is currently laying fairly 

comfortably on a stretcher.  He is quite weak and fatigued.  Short of breath 

with minimal exertion.  Short of breath with conversation.  He is maintaining O2

saturations in the low 90s on 2 L/m per nasal cannula, 87% saturation on room 

air.  He is febrile with a temperature of 101.6.  Blood pressure stable.  

Tachycardic.





On 12/09/2021 patient seen in follow-up on medical surgical floor.  He is 

resting in bed, he looks flushed, looks very fatigued, tired, but no distress, 

he remains on 4 L of oxygen pulse ox 95%, he has been febrile, with a T-max of 

100.1F.  Blood pressure has been stable.  Patient remains on Decadron 6 mg 

daily, he was started on Remdesivir yesterday, today is his second treatment, he

is on prophylactic Lovenox, and COVID-19 vitamins, he remains on IV fluids with 

0.9 normal saline at a rate of 1:30 ML per hour.  Today's labs have been 

reviewed, his d-dimer has increased and is up to 0.98, electrolytes and renal 

profile are within normal limits.  His LDH is improved and is down to 357, CRP 

is 9.5.





On 12/10/2021 patient is more awake, interactive, he is sitting up in bed, still

has a cough, he is bringing up yellow colored phlegm which will be sent for 

culture, he is still having intermittent fevers with a T-max of 101.4F this 

morning, he remains on 5 L of oxygen pulse ox is 94%, blood pressure stable, he 

is slightly tachycardic.  No pleurisy.  Lung sounds are diminished, with diffuse

crackles bilaterally.  Chest x-ray is pending, patient remains on Remdesivir, 

today is day 3 of treatment, he is on Decadron, and prophylactic Lovenox, lower 

externally Dopplers were negative for DVT.  Today's labs are still pending, 

yesterday's d-dimer was 0.98, elects lites and renal profile were unremarkable, 

his LDH had improved and was down to 357 on yesterday's labs, CRP has increased 

and is up to 9.5.  His pro calcitonin level on admission was 0.15.  Appetite is 

poor, however he denies any nausea vomiting or diarrhea.





Reevaluated today on 12/11/2021, patient remains on the regular medical floor, 

he is now on oxygen at 4 L nasal cannula, O2 saturations 92%.  Patient is on 5 L

yesterday and earlier today.  Feeling better but continues to have intermittent 

cough, cough is productive with slightly blood tinged sputum.  WBC count is 

normal hemoglobin is 16.2.  D-dimer is 0.65 today.  Electrolytes are normal 

renal profile is normal liver enzymes are elevated C-reactive protein is 15.7 

and the pro calcitonin on admission was 0.15.  Patient is on remdesivir, he is 

also on Lovenox at 40 mg subcu daily.  Decadron 6 mg daily.  Vitamin C, vitamin 

D, zinc.  And he is also on Motrin when necessary.  Chest x-ray from yesterday 

showed pneumonia consistent with COVID-19 pneumonia.











Objective





- Vital Signs


Vital signs: 


                                   Vital Signs











Temp  97.9 F   12/11/21 14:00


 


Pulse  81   12/11/21 14:00


 


Resp  16   12/11/21 14:00


 


BP  121/84   12/11/21 14:00


 


Pulse Ox  92 L  12/11/21 15:19








                                 Intake & Output











 12/10/21 12/11/21 12/11/21





 18:59 06:59 18:59


 


Intake Total 1290 480 


 


Balance 1290 480 


 


Intake:   


 


  Intake, IV Titration 1290  





  Amount   


 


    Remdesivir 100 mg In 250  





    Sodium Chloride 0.9% 250   





    ml @ 250 mls/hr IVPB   





    DAILY@1600 [facility] Rx#:   





    [id number]   


 


    Sodium Chloride 0.9% 1, 1040  





    000 ml @ 130 mls/hr IV .   





    Q7H42M [facility] Rx#:[id number]   


 


  Oral  480 


 


Other:   


 


  Voiding Method  Toilet 





  Urinal 


 


  # Voids  3 














- Exam





Physical Exam: Revealed a 51-year-old white male in no distress on few liters 

nasal cannula


Head: Atraumatic, normocephalic.


HEENT:[Neck is supple.] [No neck masses.] [No thyromegaly.] [No JVD.]


Chest: [Crackles at the bases no rhonchi and no wheezes.


Cardiac Exam: [Normal S1 and S2, no S3 gallop, no murmur.]


Abdomen: [Soft, nontender,  no megaly, no rebound, no guarding, normal bowel 

sounds.]


Extremities: [No clubbing, no edema, no cyanosis.]


Neurological Exam: [No focal neurologic deficit.]


Psychiatric: Depressed mood, blunt affect, normal mental status examination.


Skin: No rashes.


Musko skeletal: No deformities and no limitation in range of motion.





- Labs


CBC & Chem 7: 


                                 12/11/21 09:12





                                 12/11/21 09:12


Labs: 


                  Abnormal Lab Results - Last 24 Hours (Table)











  12/10/21 12/11/21 12/11/21 Range/Units





  10:20 09:12 09:12 


 


RBC    6.37 H  (4.40-5.60)  X 10*6/uL


 


Hct    52.8 H  (39.6-50.0)  %


 


MCH    25.4 L  (27.0-32.0)  pg


 


MCHC    30.7 L  (32.0-37.0)  g/dL


 


RDW    15.5 H  (11.5-14.5)  %


 


D-Dimer     (<0.60)  mg/L FEU


 


Chloride   110 H   ()  mmol/L


 


Glucose   131 H   (74-99)  mg/dL


 


AST   82 H   (17-59)  U/L


 


ALT   170 H   (4-49)  U/L


 


Alkaline Phosphatase   158 H   ()  U/L


 


C-Reactive Protein   15.7 H   (<1.0)  mg/dL


 


Total Protein   5.9 L   (6.3-8.2)  g/dL


 


Albumin   3.1 L   (3.5-5.0)  g/dL


 


Procalcitonin  0.15 H    (0.02-0.09)  ng/mL














  12/11/21 Range/Units





  09:12 


 


RBC   (4.40-5.60)  X 10*6/uL


 


Hct   (39.6-50.0)  %


 


MCH   (27.0-32.0)  pg


 


MCHC   (32.0-37.0)  g/dL


 


RDW   (11.5-14.5)  %


 


D-Dimer  0.65 H  (<0.60)  mg/L FEU


 


Chloride   ()  mmol/L


 


Glucose   (74-99)  mg/dL


 


AST   (17-59)  U/L


 


ALT   (4-49)  U/L


 


Alkaline Phosphatase   ()  U/L


 


C-Reactive Protein   (<1.0)  mg/dL


 


Total Protein   (6.3-8.2)  g/dL


 


Albumin   (3.5-5.0)  g/dL


 


Procalcitonin   (0.02-0.09)  ng/mL








                      Microbiology - Last 24 Hours (Table)











 12/10/21 10:38 Blood Culture - Preliminary





 Blood    No Growth after 24 hours


 


 12/10/21 10:20 Blood Culture - Preliminary





 Blood    No Growth after 24 hours














Assessment and Plan


Assessment: 





Impression:


Acute hypoxic respiratory failure secondary to COVID-19 pneumonia, patient is 

not vaccinated.  He had symptoms for 6 days prior to presentation hence we 

started the patient on remdesivir on 12/8/2021.


Elevated inflammatory markers


Mildly elevated d-dimer but negative Doppler.





Recommendation:


Continue to COVID-19 cocktail


Continue remdesivir


Continue oxygen and titrate accordingly


Continue Decadron


Continue multivitamins


Consider discharge planning in the next 24-48 hours at the most assuming the 

patient could be discharged home on oxygen less than 5 L.


Time with Patient: Less than 30

## 2021-12-11 NOTE — P.PN
Subjective


Principal diagnosis: 





COVID-19 pneumonitis








31-year-old male history of hypertension, admitted with COVID-19 bilateral 

pneumonia and hypoxia








Subjectively, patient is feeling better and denies any shortness of breath.  He 

remains on 4 L nasal cannula.  Just coughing more and bringing up mucoid phlegm.

 She she has been afebrile for the last 24 hours.  He denies any chest pain 

abdominal pain nausea or vomiting.





Objective





- Vital Signs


Vital signs: 


                                   Vital Signs











Temp  98.2 F   12/11/21 09:36


 


Pulse  81   12/11/21 09:36


 


Resp  18   12/11/21 09:36


 


BP  107/75   12/11/21 09:36


 


Pulse Ox  98   12/11/21 09:36








                                 Intake & Output











 12/10/21 12/11/21 12/11/21





 18:59 06:59 18:59


 


Intake Total 1290 480 


 


Balance 1290 480 


 


Intake:   


 


  Intake, IV Titration 1290  





  Amount   


 


    Remdesivir 100 mg In 250  





    Sodium Chloride 0.9% 250   





    ml @ 250 mls/hr IVPB   





    DAILY@1600 [facility] Rx#:   





    [id number]   


 


    Sodium Chloride 0.9% 1, 1040  





    000 ml @ 130 mls/hr IV .   





    Q7H42M [facility] Rx#:[id number]   


 


  Oral  480 


 


Other:   


 


  Voiding Method  Toilet 





  Urinal 


 


  # Voids  3 














- Exam








Patient is awake alert oriented 3 no any distress.  Appears somewhat tired


Head and neck: Anicteric sclera or finger mucosa is moist without lesions no 

neck masses no JVD no facial asymmetry


Lungs nonlabored breathing, on nasal cannula, normal breath sounds, bibasilar 

fine crackles, no rhonchi or wheezing


Cardiovascular: Regular rhythm and rate S1-S2 no murmurs rubs or gallops


Abdomen is soft nontender nondistended no organomegaly no flank tenderness bowel

sounds are present


Extremities: No peripheral edema warm well perfused no cough tenderness no 

cyanosis


Muscular skeletal: No joint swelling or deformities


Skin: No rashes


Neurological: No motor deficits no asterixis no chronic nerve deficits


Psychiatric: No depression or anxiety





- Labs


CBC & Chem 7: 


                                 12/11/21 09:12





                                 12/11/21 09:12


Labs: 


                  Abnormal Lab Results - Last 24 Hours (Table)











  12/10/21 12/10/21 12/11/21 Range/Units





  10:20 11:59 09:12 


 


RBC     (4.40-5.60)  X 10*6/uL


 


Hct     (39.6-50.0)  %


 


MCH     (27.0-32.0)  pg


 


MCHC     (32.0-37.0)  g/dL


 


RDW     (11.5-14.5)  %


 


D-Dimer     (<0.60)  mg/L FEU


 


Chloride    110 H  ()  mmol/L


 


Glucose    131 H  (74-99)  mg/dL


 


AST    82 H  (17-59)  U/L


 


ALT    170 H  (4-49)  U/L


 


Alkaline Phosphatase    158 H  ()  U/L


 


C-Reactive Protein    15.7 H  (<1.0)  mg/dL


 


Total Protein    5.9 L  (6.3-8.2)  g/dL


 


Albumin    3.1 L  (3.5-5.0)  g/dL


 


Procalcitonin  0.15 H    (0.02-0.09)  ng/mL


 


Urine Protein   1+ H   (Negative)  


 


Urine Blood   Trace H   (Negative)  


 


Urine Mucus   Occasional H   (None)  /hpf














  12/11/21 12/11/21 Range/Units





  09:12 09:12 


 


RBC  6.37 H   (4.40-5.60)  X 10*6/uL


 


Hct  52.8 H   (39.6-50.0)  %


 


MCH  25.4 L   (27.0-32.0)  pg


 


MCHC  30.7 L   (32.0-37.0)  g/dL


 


RDW  15.5 H   (11.5-14.5)  %


 


D-Dimer   0.65 H  (<0.60)  mg/L FEU


 


Chloride    ()  mmol/L


 


Glucose    (74-99)  mg/dL


 


AST    (17-59)  U/L


 


ALT    (4-49)  U/L


 


Alkaline Phosphatase    ()  U/L


 


C-Reactive Protein    (<1.0)  mg/dL


 


Total Protein    (6.3-8.2)  g/dL


 


Albumin    (3.5-5.0)  g/dL


 


Procalcitonin    (0.02-0.09)  ng/mL


 


Urine Protein    (Negative)  


 


Urine Blood    (Negative)  


 


Urine Mucus    (None)  /hpf














Assessment and Plan


Plan: 





#COVID-19 pneumonitis





Onset of symptoms: 5 days prior to admission


Date of diagnosis: 12/03/21


Unvaccinated


Chest x-ray: Bilateral pneumonia





CRP: 4.8 on admission trended up to 9.5


D-dimer: Normal admission


Pro calcitonin 0.15, today's repeat pending





Current oxygen requirements: 45 L nasal cannula





Treatment: remdesevir, dexamethasone, bronchodilators, pulmonary toilet, self 

pronating, Tylenol when necessary fever





Follow-up chest x-ray, Pro calcitonin, CRP


Check influenza





#Elevated liver enzymes


No GI complaints


Alk phos and bilirubin normal


Could be related to effects of coronal virus


Continue to trend liver enzymes


Hepatitis panel ordered





#Hypertension


Currently blood pressure stable


Patient is not adherent to his medical therapy


Continue to monitor his blood pressures





DVT prophylaxis: Lovenox subcu


Encourage out of bed

## 2021-12-12 LAB
BASOPHILS # BLD AUTO: 0.02 X 10*3/UL (ref 0–0.1)
BASOPHILS NFR BLD AUTO: 0.3 %
EOSINOPHIL # BLD AUTO: 0.08 X 10*3/UL (ref 0.04–0.35)
EOSINOPHIL NFR BLD AUTO: 1 %
ERYTHROCYTE [DISTWIDTH] IN BLOOD BY AUTOMATED COUNT: 6.12 X 10*6/UL (ref 4.4–5.6)
ERYTHROCYTE [DISTWIDTH] IN BLOOD: 15.6 % (ref 11.5–14.5)
HCT VFR BLD AUTO: 50.6 % (ref 39.6–50)
HGB BLD-MCNC: 15.9 G/DL (ref 13–17)
LYMPHOCYTES # SPEC AUTO: 1.55 X 10*3/UL (ref 0.9–5)
LYMPHOCYTES NFR SPEC AUTO: 20.3 %
MCH RBC QN AUTO: 26 PG (ref 27–32)
MCHC RBC AUTO-ENTMCNC: 31.4 G/DL (ref 32–37)
MCV RBC AUTO: 82.7 FL (ref 80–97)
MONOCYTES # BLD AUTO: 0.88 X 10*3/UL (ref 0.2–1)
MONOCYTES NFR BLD AUTO: 11.5 %
NEUTROPHILS # BLD AUTO: 5.06 X 10*3/UL (ref 1.8–7.7)
NEUTROPHILS NFR BLD AUTO: 66.2 %
PLATELET # BLD AUTO: 294 X 10*3/UL (ref 140–440)
WBC # BLD AUTO: 7.64 X 10*3/UL (ref 4.5–10)

## 2021-12-12 RX ADMIN — OXYCODONE HYDROCHLORIDE AND ACETAMINOPHEN SCH MG: 500 TABLET ORAL at 22:13

## 2021-12-12 RX ADMIN — REMDESIVIR SCH MLS/HR: 100 INJECTION, POWDER, LYOPHILIZED, FOR SOLUTION INTRAVENOUS at 16:08

## 2021-12-12 RX ADMIN — ENOXAPARIN SODIUM SCH MG: 40 INJECTION SUBCUTANEOUS at 07:24

## 2021-12-12 RX ADMIN — OXYCODONE HYDROCHLORIDE AND ACETAMINOPHEN SCH MG: 500 TABLET ORAL at 07:24

## 2021-12-12 RX ADMIN — Medication SCH MCG: at 07:24

## 2021-12-12 RX ADMIN — CALCIUM CARBONATE (ANTACID) CHEW TAB 500 MG PRN MG: 500 CHEW TAB at 17:00

## 2021-12-12 RX ADMIN — CALCIUM CARBONATE (ANTACID) CHEW TAB 500 MG PRN MG: 500 CHEW TAB at 22:14

## 2021-12-12 RX ADMIN — Medication SCH MG: at 07:24

## 2021-12-12 NOTE — P.PN
Subjective


Progress Note Date: 12/12/21


Principal diagnosis: 


 Dyspnea, hypoxia





This is a very pleasant 31-year-old male patient with a history of obesity.  6 

days ago the patient went to put and his contact lens and felt that he had 

gotten something else 5.  A gun to [facility] for evaluation.  He

was found to have COVID-19 infection.  He was treated with azithromycin, 

Decadron and an albuterol inhaler.  He presented here early there this morning 

with worsening shortness of breath cough and congestion.  Chest x-ray revealed 

evidence of low lung volumes.  Some patchy bilateral infiltrates consistent with

COVID-19 pneumonia.  White count 6.8.  Hemoglobin 16.9.  Lymphocytes 0.8.  

Sodium 141.  Potassium 4.8.  Creatinine 1.24.  AST 54.  ALT 68.  .  C-

reactive protein 4.8.  Corona virus by PCR positive.  He's been initiated on 

Lovenox and Decadron.  0.9 normal saline at 130 ML's per hour.  He is seen today

in consultation in the emergency department.  He is currently laying fairly 

comfortably on a stretcher.  He is quite weak and fatigued.  Short of breath 

with minimal exertion.  Short of breath with conversation.  He is maintaining O2

saturations in the low 90s on 2 L/m per nasal cannula, 87% saturation on room 

air.  He is febrile with a temperature of 101.6.  Blood pressure stable.  

Tachycardic.





On 12/09/2021 patient seen in follow-up on medical surgical floor.  He is 

resting in bed, he looks flushed, looks very fatigued, tired, but no distress, 

he remains on 4 L of oxygen pulse ox 95%, he has been febrile, with a T-max of 

100.1F.  Blood pressure has been stable.  Patient remains on Decadron 6 mg 

daily, he was started on Remdesivir yesterday, today is his second treatment, he

is on prophylactic Lovenox, and COVID-19 vitamins, he remains on IV fluids with 

0.9 normal saline at a rate of 1:30 ML per hour.  Today's labs have been 

reviewed, his d-dimer has increased and is up to 0.98, electrolytes and renal 

profile are within normal limits.  His LDH is improved and is down to 357, CRP 

is 9.5.





On 12/10/2021 patient is more awake, interactive, he is sitting up in bed, still

has a cough, he is bringing up yellow colored phlegm which will be sent for 

culture, he is still having intermittent fevers with a T-max of 101.4F this 

morning, he remains on 5 L of oxygen pulse ox is 94%, blood pressure stable, he 

is slightly tachycardic.  No pleurisy.  Lung sounds are diminished, with diffuse

crackles bilaterally.  Chest x-ray is pending, patient remains on Remdesivir, 

today is day 3 of treatment, he is on Decadron, and prophylactic Lovenox, lower 

externally Dopplers were negative for DVT.  Today's labs are still pending, 

yesterday's d-dimer was 0.98, elects lites and renal profile were unremarkable, 

his LDH had improved and was down to 357 on yesterday's labs, CRP has increased 

and is up to 9.5.  His pro calcitonin level on admission was 0.15.  Appetite is 

poor, however he denies any nausea vomiting or diarrhea.





On 12/12/2021 patient seen in follow-up on medical surgical floor, he is 

currently sitting up in the recliner, he looks better today, more awake, more 

responsive and conversant, he is on 4 L of oxygen pulse ox of 93%, today he will

receive his final dose of Remdesivir, he continues on Decadron 6 mg daily, 

Lovenox 40 mg daily and COVID-19 multivitamins.  He's had no acute events 

overnight, no fever or chills, he is a shallow breather, I asked has been 

provided, patient needs encouragement to use it, lung sounds are diminished, 

with some minimal rales at the bilateral bases.  Yesterday he was able to get up

and take a shower.  On yesterday labs his liver enzymes continue to increase an 

ultrasound of the liver is currently pending.  Today's labs have been reviewed 

his white count is 7.6, hemoglobin is 15.9, d-dimer 0.65, pro calcitonin level 

was -0.15, his CMP from today is still pending.  No nausea or vomiting, abdomen 

is soft, nontender








Objective





- Vital Signs


Vital signs: 


                                   Vital Signs











Temp  97.8 F   12/12/21 09:55


 


Pulse  80   12/12/21 09:55


 


Resp  18   12/12/21 09:55


 


BP  118/76   12/12/21 09:55


 


Pulse Ox  93 L  12/12/21 09:55








                                 Intake & Output











 12/11/21 12/12/21 12/12/21





 18:59 06:59 18:59


 


Intake Total 1080 1560 250


 


Balance 1080 1560 250


 


Intake:   


 


  Intake, IV Titration   250





  Amount   


 


    Remdesivir 100 mg In   250





    Sodium Chloride 0.9% 250   





    ml @ 250 mls/hr IVPB   





    DAILY@1600 [facility] Rx#:   





    [id number]   


 


  Oral 1080 1560 


 


Other:   


 


  Voiding Method  Toilet 





  Urinal 


 


  # Voids 2  














- Exam


 GENERAL EXAM: Alert, very pleasant, 31-year-old white male, currently on 4 L 

oxygen with pulse ox of 95% comfortable in no apparent distress.  More awake on 

today's exam, sitting up in the recliner, breathing comfortably


HEAD: Normocephalic/atraumatic.


EYES: Normal reaction of pupils, equal size.  Conjunctiva pink, sclera white.


NOSE: Clear with pink turbinates.


THROAT: No erythema or exudates.


NECK: No masses, no JVD, no thyroid enlargement, no adenopathy.


CHEST: No chest wall deformity.  Symmetrical expansion. 


LUNGS: Equal air entry with bibasilar crackles


CVS: Regular rate and rhythm, normal S1 and S2, no gallops, no murmurs, no rubs


ABDOMEN: Soft, nontender.  No hepatosplenomegaly, normal bowel sounds, no 

guarding or rigidity.


EXTREMITIES: No clubbing, no edema, no cyanosis, 2+ pulses and upper and lower 

extremities.


MUSCULOSKELETAL: Muscle strength and tone normal.


SPINE: No scoliosis or deformity


SKIN: No rashes


CENTRAL NERVOUS SYSTEM: Alert, oriented 3, easily arousable











- Labs


CBC & Chem 7: 


                                 12/12/21 05:40





                                 12/11/21 09:12


Labs: 


                  Abnormal Lab Results - Last 24 Hours (Table)











  12/11/21 12/12/21 Range/Units





  09:12 05:40 


 


RBC   6.12 H  (4.40-5.60)  X 10*6/uL


 


Hct   50.6 H  (39.6-50.0)  %


 


MCH   26.0 L  (27.0-32.0)  pg


 


MCHC   31.4 L  (32.0-37.0)  g/dL


 


RDW   15.6 H  (11.5-14.5)  %


 


Immature Gran #   0.05 H  (0.00-0.04)  X 10*3/uL


 


Hepatitis A Ab Total  Reactive A   (Nonreactive)  


 


Hep Bs Antibody  Reactive A   (Nonreactive)  








                      Microbiology - Last 24 Hours (Table)











 12/10/21 10:38 Blood Culture - Preliminary





 Blood    No Growth after 24 hours


 


 12/10/21 10:20 Blood Culture - Preliminary





 Blood    No Growth after 24 hours














Assessment and Plan


Plan: 


 Assessment:





#1.  Acute hypoxic respiratory failure secondary to COVID-19 pneumonia, patient 

is a non-vaccinated adult, came in with a 6 day history of symptoms, was started

on Remdesivir on 12/08/2021





#2.  Elevated inflammatory markers secondary to the above, improving





#3.  Obesity with BMI of 37.7 kg/m





#4.  Mildly elevated d-dimer,  lower extremity Dopplers were negative for DVT





#5.  Elevated liver enzymes, possibly related to COVID-19 infection, ultrasound 

the abdomen is pending





Plan:





No worsening dyspnea


Continue encouraging deep breathing and coughing and incentive spirometer use


Patient is finishing his Remdesivir course today


Continue Decadron, continue Lovenox


Ultrasound the abdomen is pending


His LFTs are being followed


From pulmonary perspective his breathing is stable and improving


We'll continue to follow his clinical course








I performed a history & physical examination of the patient and discussed their 

management with my nurse practitioner, Jovita Chin.  I reviewed the nurse 

practitioner's note and agree with the documented findings and plan of care.  

Lung sounds are positive for dim breath sounds throughout the lung fields.  The 

findings and the impression was discussed with the patient.  I attest to the 

documentation by the nurse practitioner. 








Time with Patient: Less than 30

## 2021-12-12 NOTE — P.PN
Subjective


Principal diagnosis: 





COVID-19 pneumonitis








31-year-old male history of hypertension, admitted with COVID-19 bilateral 

pneumonia and hypoxia








Patient states that he is feeling better today.  He has been afebrile for the 

last 48 hours.  His appetite improving.  He remains on 5 L nasal cannula.  No 

abdominal pain no nausea no vomiting no chest pain.





Objective





- Vital Signs


Vital signs: 


                                   Vital Signs











Temp  97.8 F   12/12/21 06:15


 


Pulse  72   12/12/21 06:15


 


Resp  16   12/12/21 06:15


 


BP  126/83   12/12/21 06:15


 


Pulse Ox  95   12/12/21 06:15








                                 Intake & Output











 12/11/21 12/12/21 12/12/21





 18:59 06:59 18:59


 


Intake Total 1080 1560 


 


Balance 1080 1560 


 


Intake:   


 


  Oral 1080 1560 


 


Other:   


 


  Voiding Method  Toilet 





  Urinal 


 


  # Voids 2  














- Exam








Patient is awake alert oriented 3 no any distress.  Appears somewhat tired


Head and neck: Anicteric sclera or finger mucosa is moist without lesions no 

neck masses no JVD no facial asymmetry


Lungs nonlabored breathing, on nasal cannula, normal breath sounds, bibasilar 

fine crackles, no rhonchi or wheezing


Cardiovascular: Regular rhythm and rate S1-S2 no murmurs rubs or gallops


Abdomen is soft nontender nondistended no organomegaly no flank tenderness bowel

sounds are present


Extremities: No peripheral edema warm well perfused no cough tenderness no cya

nosis


Muscular skeletal: No joint swelling or deformities


Skin: No rashes


Neurological: No motor deficits no asterixis no chronic nerve deficits


Psychiatric: No depression or anxiety





- Labs


CBC & Chem 7: 


                                 12/11/21 09:12





                                 12/11/21 09:12


Labs: 


                  Abnormal Lab Results - Last 24 Hours (Table)











  12/11/21 12/11/21 12/11/21 Range/Units





  09:12 09:12 09:12 


 


RBC    6.37 H  (4.40-5.60)  X 10*6/uL


 


Hct    52.8 H  (39.6-50.0)  %


 


MCH    25.4 L  (27.0-32.0)  pg


 


MCHC    30.7 L  (32.0-37.0)  g/dL


 


RDW    15.5 H  (11.5-14.5)  %


 


D-Dimer     (<0.60)  mg/L FEU


 


Chloride   110 H   ()  mmol/L


 


Glucose   131 H   (74-99)  mg/dL


 


AST   82 H   (17-59)  U/L


 


ALT   170 H   (4-49)  U/L


 


Alkaline Phosphatase   158 H   ()  U/L


 


C-Reactive Protein   15.7 H   (<1.0)  mg/dL


 


Total Protein   5.9 L   (6.3-8.2)  g/dL


 


Albumin   3.1 L   (3.5-5.0)  g/dL


 


Hepatitis A Ab Total  Reactive A    (Nonreactive)  


 


Hep Bs Antibody  Reactive A    (Nonreactive)  














  12/11/21 Range/Units





  09:12 


 


RBC   (4.40-5.60)  X 10*6/uL


 


Hct   (39.6-50.0)  %


 


MCH   (27.0-32.0)  pg


 


MCHC   (32.0-37.0)  g/dL


 


RDW   (11.5-14.5)  %


 


D-Dimer  0.65 H  (<0.60)  mg/L FEU


 


Chloride   ()  mmol/L


 


Glucose   (74-99)  mg/dL


 


AST   (17-59)  U/L


 


ALT   (4-49)  U/L


 


Alkaline Phosphatase   ()  U/L


 


C-Reactive Protein   (<1.0)  mg/dL


 


Total Protein   (6.3-8.2)  g/dL


 


Albumin   (3.5-5.0)  g/dL


 


Hepatitis A Ab Total   (Nonreactive)  


 


Hep Bs Antibody   (Nonreactive)  








                      Microbiology - Last 24 Hours (Table)











 12/10/21 10:38 Blood Culture - Preliminary





 Blood    No Growth after 24 hours


 


 12/10/21 10:20 Blood Culture - Preliminary





 Blood    No Growth after 24 hours














Assessment and Plan


Plan: 





#COVID-19 pneumonitis with acute hypoxic respiratory failure





Onset of symptoms: 5 days prior to admission


Date of diagnosis: 12/03/21


Unvaccinated


Chest x-ray: Bilateral pneumonia





CRP: 4.8 on admission trended up to 9.5


D-dimer: Normal admission


Pro calcitonin 0.15





Current oxygen requirements: 45 L nasal cannula





Treatment: remdesevir, dexamethasone, bronchodilators, pulmonary toilet, self 

pronating, Tylenol when necessary fever





Follow-up chest x-ray, Pro calcitonin, CRP, encourage diet and out of bed





#Elevated liver enzymes


Liver enzymes trending down


No GI complaints


Alk phos and bilirubin normal


Could be related to effects of coronal virus


Hepatitis panel ordered


YS abdomen ordered





#Hypertension


Currently blood pressure stable


Patient is not adherent to his medical therapy


Continue to monitor his blood pressures





#Obesity


Encourage out of bed, incentive spirometry





DVT prophylaxis: Lovenox subcu

## 2021-12-12 NOTE — US
EXAMINATION TYPE: US abdomen limited

 

DATE OF EXAM: 12/12/2021

 

COMPARISON: NONE

 

CLINICAL HISTORY: Elevated liver enzymes, evaluate hepatobiliary sys. Covid +.  Abnormal labs. 

 

EXAM MEASUREMENTS:

 

Liver Length:  20.2 cm   

Gallbladder Wall:  0.2 cm   

CBD:  0.3 cm

Right Kidney:  10.3 x 5.0 x 5.5 cm

 

 

 

Pancreas:  Obscured by bowel gas

Liver:  Limited due to bowel gas.  Scanned through ribs.  Enlarged in size.   

Gallbladder:  Multiple echogenic mobile foci. 

**Evidence for sonographic Rios's sign:  neg

CBD:  wnl 

Right Kidney:  No hydronephrosis or masses seen 

 

 

 

IMPRESSION:

Mild hepatomegaly. No dilated ducts. Numerous gallstones. Normal right kidney.

## 2021-12-13 VITALS
RESPIRATION RATE: 16 BRPM | SYSTOLIC BLOOD PRESSURE: 121 MMHG | TEMPERATURE: 97.8 F | HEART RATE: 79 BPM | DIASTOLIC BLOOD PRESSURE: 79 MMHG

## 2021-12-13 LAB
ALBUMIN SERPL-MCNC: 3.3 G/DL (ref 3.8–4.9)
ALBUMIN/GLOB SERPL: 1.38 G/DL (ref 1.6–3.17)
ALP SERPL-CCNC: 153 U/L (ref 41–126)
ALT SERPL-CCNC: 159 U/L (ref 10–49)
ANION GAP SERPL CALC-SCNC: 11.1 MMOL/L (ref 10–18)
AST SERPL-CCNC: 63 U/L (ref 14–35)
BUN SERPL-SCNC: 18 MG/DL (ref 9–27)
BUN/CREAT SERPL: 22.5 RATIO (ref 12–20)
CALCIUM SPEC-MCNC: 8.9 MG/DL (ref 8.7–10.3)
CHLORIDE SERPL-SCNC: 108 MMOL/L (ref 96–109)
CO2 SERPL-SCNC: 21.9 MMOL/L (ref 20–27.5)
GLOBULIN SER CALC-MCNC: 2.4 G/DL (ref 1.6–3.3)
GLUCOSE SERPL-MCNC: 102 MG/DL (ref 70–110)
M PNEUMO IGG SER IA-ACNC: 3.22 INDEX (ref ?–0.9)
M PNEUMO IGM SER QL IA: 0.27 INDEX (ref ?–0.9)
POTASSIUM SERPL-SCNC: 4.1 MMOL/L (ref 3.5–5.5)
PROT SERPL-MCNC: 5.7 G/DL (ref 6.2–8.2)
SODIUM SERPL-SCNC: 141 MMOL/L (ref 135–145)

## 2021-12-13 RX ADMIN — OXYCODONE HYDROCHLORIDE AND ACETAMINOPHEN SCH MG: 500 TABLET ORAL at 08:30

## 2021-12-13 RX ADMIN — Medication SCH MG: at 08:30

## 2021-12-13 RX ADMIN — ENOXAPARIN SODIUM SCH MG: 40 INJECTION SUBCUTANEOUS at 08:30

## 2021-12-13 RX ADMIN — Medication SCH MCG: at 08:30

## 2021-12-13 NOTE — P.PN
Subjective


Progress Note Date: 12/13/21


Principal diagnosis: 


 Dyspnea, hypoxia





This is a very pleasant 31-year-old male patient with a history of obesity.  6 

days ago the patient went to put and his contact lens and felt that he had 

gotten something else 5.  A gun to [facility] for evaluation.  He

was found to have COVID-19 infection.  He was treated with azithromycin, 

Decadron and an albuterol inhaler.  He presented here early there this morning 

with worsening shortness of breath cough and congestion.  Chest x-ray revealed 

evidence of low lung volumes.  Some patchy bilateral infiltrates consistent with

COVID-19 pneumonia.  White count 6.8.  Hemoglobin 16.9.  Lymphocytes 0.8.  

Sodium 141.  Potassium 4.8.  Creatinine 1.24.  AST 54.  ALT 68.  .  C-

reactive protein 4.8.  Corona virus by PCR positive.  He's been initiated on 

Lovenox and Decadron.  0.9 normal saline at 130 ML's per hour.  He is seen today

in consultation in the emergency department.  He is currently laying fairly 

comfortably on a stretcher.  He is quite weak and fatigued.  Short of breath 

with minimal exertion.  Short of breath with conversation.  He is maintaining O2

saturations in the low 90s on 2 L/m per nasal cannula, 87% saturation on room 

air.  He is febrile with a temperature of 101.6.  Blood pressure stable.  

Tachycardic.





On 12/09/2021 patient seen in follow-up on medical surgical floor.  He is 

resting in bed, he looks flushed, looks very fatigued, tired, but no distress, 

he remains on 4 L of oxygen pulse ox 95%, he has been febrile, with a T-max of 

100.1F.  Blood pressure has been stable.  Patient remains on Decadron 6 mg 

daily, he was started on Remdesivir yesterday, today is his second treatment, he

is on prophylactic Lovenox, and COVID-19 vitamins, he remains on IV fluids with 

0.9 normal saline at a rate of 1:30 ML per hour.  Today's labs have been 

reviewed, his d-dimer has increased and is up to 0.98, electrolytes and renal 

profile are within normal limits.  His LDH is improved and is down to 357, CRP 

is 9.5.





On 12/10/2021 patient is more awake, interactive, he is sitting up in bed, still

has a cough, he is bringing up yellow colored phlegm which will be sent for 

culture, he is still having intermittent fevers with a T-max of 101.4F this 

morning, he remains on 5 L of oxygen pulse ox is 94%, blood pressure stable, he 

is slightly tachycardic.  No pleurisy.  Lung sounds are diminished, with diffuse

crackles bilaterally.  Chest x-ray is pending, patient remains on Remdesivir, 

today is day 3 of treatment, he is on Decadron, and prophylactic Lovenox, lower 

externally Dopplers were negative for DVT.  Today's labs are still pending, 

yesterday's d-dimer was 0.98, elects lites and renal profile were unremarkable, 

his LDH had improved and was down to 357 on yesterday's labs, CRP has increased 

and is up to 9.5.  His pro calcitonin level on admission was 0.15.  Appetite is 

poor, however he denies any nausea vomiting or diarrhea.





On 12/12/2021 patient seen in follow-up on medical surgical floor, he is 

currently sitting up in the recliner, he looks better today, more awake, more 

responsive and conversant, he is on 4 L of oxygen pulse ox of 93%, today he will

receive his final dose of Remdesivir, he continues on Decadron 6 mg daily, 

Lovenox 40 mg daily and COVID-19 multivitamins.  He's had no acute events 

overnight, no fever or chills, he is a shallow breather, I asked has been 

provided, patient needs encouragement to use it, lung sounds are diminished, 

with some minimal rales at the bilateral bases.  Yesterday he was able to get up

and take a shower.  On yesterday labs his liver enzymes continue to increase an 

ultrasound of the liver is currently pending.  Today's labs have been reviewed 

his white count is 7.6, hemoglobin is 15.9, d-dimer 0.65, pro calcitonin level 

was -0.15, his CMP from today is still pending.  No nausea or vomiting, abdomen 

is soft, nontender





On 12/13/2021 patient seen in follow-up on medical surgical floor.  Is awake and

alert, in no acute distress, breathing comfortably, his cough has improved, he 

is currently on 4 L of oxygen pulse ox is 95%, his been afebrile, 

hemodynamically has been stable, his been tolerating ambulation in the room, his

had no acute events overnight.  Liver ultrasound was completed showing mild 

hepatomegaly, no dilated ducts, numerous gallstones, normal right kidney.  He 

remains on Decadron, prophylactic Lovenox and COVID-19 multivitamins.  No nausea

or vomiting, no abdominal pain, today's labs have been reviewed, his 

electrolytes and renal profile were within normal limits, his LFTs were 

improving on today's labs.  His total bilirubin was within normal limits.  His 

pro calcitonin level was negative at 0.15.  His inflammatory markers were 

improving.








Objective





- Vital Signs


Vital signs: 


                                   Vital Signs











Temp  97.8 F   12/13/21 09:52


 


Pulse  79   12/13/21 09:52


 


Resp  16   12/13/21 09:52


 


BP  121/79   12/13/21 09:52


 


Pulse Ox  95   12/13/21 09:52








                                 Intake & Output











 12/12/21 12/13/21 12/13/21





 18:59 06:59 18:59


 


Intake Total 250 960 


 


Balance 250 960 


 


Intake:   


 


  Intake, IV Titration 250  





  Amount   


 


    Remdesivir 100 mg In 250  





    Sodium Chloride 0.9% 250   





    ml @ 250 mls/hr IVPB   





    DAILY@1600 [facility] Rx#:   





    [id number]   


 


  Oral  960 


 


Other:   


 


  Voiding Method  Toilet Toilet





  Urinal 


 


  # Voids  2 














- Exam


 GENERAL EXAM: Alert, very pleasant, 31-year-old white male, currently on 4 L 

oxygen with pulse ox of 95% comfortable in no apparent distress.  More awake on 

today's exam, sitting up in the recliner, breathing comfortably


HEAD: Normocephalic/atraumatic.


EYES: Normal reaction of pupils, equal size.  Conjunctiva pink, sclera white.


NOSE: Clear with pink turbinates.


THROAT: No erythema or exudates.


NECK: No masses, no JVD, no thyroid enlargement, no adenopathy.


CHEST: No chest wall deformity.  Symmetrical expansion. 


LUNGS: Equal air entry with bibasilar crackles


CVS: Regular rate and rhythm, normal S1 and S2, no gallops, no murmurs, no rubs


ABDOMEN: Soft, nontender.  No hepatosplenomegaly, normal bowel sounds, no 

guarding or rigidity.


EXTREMITIES: No clubbing, no edema, no cyanosis, 2+ pulses and upper and lower 

extremities.


MUSCULOSKELETAL: Muscle strength and tone normal.


SPINE: No scoliosis or deformity


SKIN: No rashes


CENTRAL NERVOUS SYSTEM: Alert, oriented 3, easily arousable











- Labs


CBC & Chem 7: 


                                 12/12/21 05:40





                                 12/13/21 06:15


Labs: 


                  Abnormal Lab Results - Last 24 Hours (Table)











  12/11/21 12/13/21 Range/Units





  09:12 06:15 


 


BUN/Creatinine Ratio   22.50 H  (12.00-20.00)  Ratio


 


AST   63 H  (14-35)  U/L


 


ALT   159 H  (10-49)  U/L


 


Alkaline Phosphatase   153 H  ()  U/L


 


Total Protein   5.7 L  (6.2-8.2)  g/dL


 


Albumin   3.3 L  (3.8-4.9)  g/dL


 


Albumin/Globulin Ratio   1.38 L  (1.60-3.17)  g/dL


 


Mycoplasma pneumon IgG  3.22 H   (<=0.90)  INDEX








                      Microbiology - Last 24 Hours (Table)











 12/10/21 10:38 Blood Culture - Preliminary





 Blood    No Growth after 72 hours


 


 12/10/21 10:20 Blood Culture - Preliminary





 Blood    No Growth after 72 hours














Assessment and Plan


Plan: 


 Assessment:





#1.  Acute hypoxic respiratory failure secondary to COVID-19 pneumonia, patient 

is a non-vaccinated adult, came in with a 6 day history of symptoms, was started

on Remdesivir on 12/08/2021





#2.  Elevated inflammatory markers secondary to the above, improving





#3.  Obesity with BMI of 37.7 kg/m





#4.  Mildly elevated d-dimer,  lower extremity Dopplers were negative for DVT





#5.  Elevated liver enzymes, possibly related to COVID-19 infection, ultrasound 

the abdomen showing mild hepatomegaly, and multiple gallstones





Plan:





No worsening dyspnea


Remains on 4 L of oxygen,


Breathing comfortably


His cough is improving, he has had no fever or chills,


Ultrasound abdomen has been noted


His LFTs are improving on today's labs


Inflammatory markers are improving


Stable for discharge home to finish outpatient course of Decadron for a total of

10 days


Outpatient follow-up with Dr. Taylor in the office in 2 weeks





I performed a history & physical examination of the patient and discussed their 

management with my nurse practitioner, Jovita Chin.  I reviewed the nurse 

practitioner's note and agree with the documented findings and plan of care.  

Lung sounds are positive for dim breath sounds throughout the lung fields.  The 

findings and the impression was discussed with the patient.  I attest to the 

documentation by the nurse practitioner. 








Time with Patient: Less than 30

## 2021-12-13 NOTE — P.DS
Providers


Date of admission: 


12/08/21 10:37





Expected date of discharge: 12/13/21


Attending physician: 


Jordyn Dennis DO





Consults: 





                                        





12/08/21 11:41


Consult Physician Urgent 


   Consulting Provider: Mario Navarro Reason/Comments: hypoxia


   Do you want consulting provider notified?: Yes











Primary care physician: 


Cruz Liang MD





Hospital Course: 


Discharge Diagnosis:


COVID-19 pneumonitis


Acute hypoxic respiratory failure


Transaminitis secondary to above


Multiple asymptomatic cholelithiasis


HTN


Obesity BMI 37.7








Hospital Course: 


Patient is a 36-year-old male for history of hypertension who came to the 

emergency department for shortness of breath and fevers.  He had tested for 

Covid 5 days prior to admission.  He is found to be 87% on room air.  He was 

admitted for further monitoring.  He was started on Decadron.  Pulmonary was 

consulted and he was started on Remdesivir.  His max oxygen requirements during 

his hospital stay was 5 L nasal cannula.  He was noted to have some elevated 

liver enzymes and underwent a liver ultrasound which showed some mild 

hepatomegaly with gallstones.,  No evidence of dilated common bile duct.  

Hepatitis B screening was consistent with prior vaccination, hepatitis a IgM was

negative with a high total antibody indicating prior vaccination or infection 

but nothing acute.  Influenza was negative, mycoplasma IgM was negative.  He 

continued to improve throughout his hospital stay was determined stable for 

discharge home.  He will require home O2 at discharge secondary to COVID-19 

pneumonia.  He will follow-up with his primary care physician in 2-3 days.  

He'll complete 10 days of Decadron therapy.  He should also follow-up with 

general surgery after resolution of his Covid for treatment of gallstones





Patient seen and examined at bedside.  Feeling much better than when he came in.

 Still having some intermittent shortness of breath.  No nausea or vomiting.  

Tolerating a diet.  No diarrhea.





Vital signs reviewed and stable. 


General: ill appearing, no distress, appears at stated age


Derm: warm, dry


Head: atraumatic, normocephalic, symmetric


Eyes: EOMI, no lid lag, anicteric sclera


Mouth: no lip lesion, mucus membranes moist


Cardiovascular: S1S2 reg, no murmur, positive posterior tibial pulse bilateral, 


Lungs: Course bs bilateral, no rhonchi, no rales , no accessory muscle use


Abdominal: soft,  nontender to palpation, no guarding, no appreciable 

organomegaly


Ext: no gross muscle atrophy, no edema, no contractures


Neuro:  CN II-XI grossly intact, no focal neuro deficits


Psych: Alert, oriented, appropriate affect 








A total of 37 minutes of time were spent preparing this complex discharge 

summary .





Patient Condition at Discharge: Stable





Plan - Discharge Summary


Discharge Rx Participant: No


New Discharge Prescriptions: 


New


   Zinc Sulfate [Orazinc] 220 mg PO DAILY  cap


   Calcium Carbonate [Tums] 1,000 mg PO TID PRN  tab


     PRN Reason: Heartburn


   Ascorbic Acid [Vitamin C] 500 mg PO BID #0 tab


   Cholecalciferol [Vitamin D3 (25 Mcg = 1000 Iu)] 50 mcg PO DAILY #0 tablet


   dexAMETHasone ORAL [Hexadrol] 6 mg PO DAILY #12 tab





Continue


   Losartan Potassium 100 mg PO DAILY


   Butalbital/Acetaminophen [Butalbital/Acetaminophen ] 1 tab PO Q6H PRN


     PRN Reason: Migraine Headache


   Albuterol Inhaler [Ventolin Hfa Inhaler] 1 - 2 puff INHALATION RT-Q4H PRN


     PRN Reason: Shortness Of Breath


   Aspirin EC [Ecotrin] 325 mg PO DAILY





Discontinued


   Phentermine HCl [Adipex-P] 37.5 mg PO DAILY


   Azithromycin [Zithromax Z-pack (6 tabs)] See Taper PO DAILY


   Dexamethasone [Decadron] 4 mg PO Q12H


Discharge Medication List





Albuterol Inhaler [Ventolin Hfa Inhaler] 1 - 2 puff INHALATION RT-Q4H PRN 

12/08/21 [History]


Aspirin EC [Ecotrin] 325 mg PO DAILY 12/08/21 [History]


Butalbital/Acetaminophen [Butalbital/Acetaminophen ] 1 tab PO Q6H PRN 

12/08/21 [History]


Losartan Potassium 100 mg PO DAILY 12/08/21 [History]


Ascorbic Acid [Vitamin C] 500 mg PO BID #0 tab 12/13/21 [Rx]


Calcium Carbonate [Tums] 1,000 mg PO TID PRN  tab 12/13/21 [Rx]


Cholecalciferol [Vitamin D3 (25 Mcg = 1000 Iu)] 50 mcg PO DAILY #0 tablet 

12/13/21 [Rx]


Zinc Sulfate [Orazinc] 220 mg PO DAILY  cap 12/13/21 [Rx]


dexAMETHasone ORAL [Hexadrol] 6 mg PO DAILY #12 tab 12/13/21 [Rx]








Follow up Appointment(s)/Referral(s): 


Julio Cesar Joseph MD [Medical Doctor] - 1 Week


()


Mario Navarro MD [STAFF PHYSICIAN] - 01/13/22 1:30 pm


Cruz Liang MD [Primary Care Provider] - 1-2 days (Office not answering at 

this time - please call to make follow up appointment with your Primary Care 

Provider on discharge. )


Patient Instructions/Handouts:  Coronavirus Disease 2019 (COVID-19), Using 

Oxygen at Home (DC)


Activity/Diet/Wound Care/Special Instructions: 


Activity:


as tolerated





Diet: 


Heart Healthy


Discharge Disposition: HOME SELF-CARE